# Patient Record
Sex: FEMALE | Race: WHITE | NOT HISPANIC OR LATINO
[De-identification: names, ages, dates, MRNs, and addresses within clinical notes are randomized per-mention and may not be internally consistent; named-entity substitution may affect disease eponyms.]

---

## 2019-10-16 ENCOUNTER — TRANSCRIPTION ENCOUNTER (OUTPATIENT)
Age: 34
End: 2019-10-16

## 2020-05-09 ENCOUNTER — TRANSCRIPTION ENCOUNTER (OUTPATIENT)
Age: 35
End: 2020-05-09

## 2020-08-13 ENCOUNTER — EMERGENCY (EMERGENCY)
Facility: HOSPITAL | Age: 35
LOS: 0 days | Discharge: HOME | End: 2020-08-14
Attending: EMERGENCY MEDICINE | Admitting: EMERGENCY MEDICINE
Payer: MEDICAID

## 2020-08-13 VITALS
OXYGEN SATURATION: 100 % | RESPIRATION RATE: 16 BRPM | HEART RATE: 83 BPM | DIASTOLIC BLOOD PRESSURE: 74 MMHG | WEIGHT: 160.06 LBS | SYSTOLIC BLOOD PRESSURE: 118 MMHG | TEMPERATURE: 99 F

## 2020-08-13 DIAGNOSIS — Z98.891 HISTORY OF UTERINE SCAR FROM PREVIOUS SURGERY: ICD-10-CM
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
## 2020-08-13 DIAGNOSIS — S92.902A UNSPECIFIED FRACTURE OF LEFT FOOT, INITIAL ENCOUNTER FOR CLOSED FRACTURE: ICD-10-CM
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
## 2020-08-13 DIAGNOSIS — Z98.84 BARIATRIC SURGERY STATUS: Chronic | ICD-10-CM

## 2020-08-13 DIAGNOSIS — Z98.890 OTHER SPECIFIED POSTPROCEDURAL STATES: Chronic | ICD-10-CM

## 2020-08-13 DIAGNOSIS — M79.673 PAIN IN UNSPECIFIED FOOT: ICD-10-CM
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
## 2020-08-13 DIAGNOSIS — Z98.891 HISTORY OF UTERINE SCAR FROM PREVIOUS SURGERY: Chronic | ICD-10-CM

## 2020-08-13 PROCEDURE — 99284 EMERGENCY DEPT VISIT MOD MDM: CPT

## 2020-08-13 RX ORDER — KETOROLAC TROMETHAMINE 30 MG/ML
15 SYRINGE (ML) INJECTION ONCE
Refills: 0 | Status: DISCONTINUED | OUTPATIENT
Start: 2020-08-13 | End: 2020-08-13

## 2020-08-13 RX ADMIN — Medication 15 MILLIGRAM(S): at 23:50

## 2020-08-13 NOTE — ED ADULT NURSE NOTE - OBJECTIVE STATEMENT
Patient reports trip and fall, felt something crack to left lateral foot, has pain and swelling, able to bear weight with pain. Patient denies any other injury.

## 2020-08-13 NOTE — ED ADULT NURSE NOTE - CHIEF COMPLAINT QUOTE
pt states she slipped in her bathroom and "heard he bones crack"  pt c.o. right foot pain  (-)head trauma/LOC

## 2020-08-13 NOTE — ED ADULT TRIAGE NOTE - ESI TRIAGE ACUITY LEVEL, MLM
Daily Note     Today's date: 10/16/2018  Patient name: Romana Carmel  : 1989  MRN: [id number]  Referring provider: Jil Hoffman MD  Dx:   Encounter Diagnosis     ICD-10-CM    1  Tenosynovitis of right wrist M65 9                   Subjective: "It's a little stiff, but getting better"    Objective: See treatment diary below  Assessment: Has difficulty making a tight grasp and presents with a little tightness in intrinsic muscles  Scar tissue appears to be affecting tendon gliding  Paper tape administered and educated Jordan Chen on benefits of using paper tape at night over scar  Plan: Continue per plan of care       Precautions: Edwards    Specialty Daily Treatment Diary     Manual  10/2 10/5 10/12 10/16    AAROM digits 5' 5' Place and hold for tendon excursion  5' Place and hold for tendon excursion  5' Place and hold     Wrist stretching-gentle 5' 5' 5' 5'    STM  10' with scar sucker 5' 5'                        Exercise Diary  10/2 10/5 10/12 10/16    HEP: TG, blocking, wrist ROM Issued       Progressive grasp pencils  2x      Keypegs  1x 1x 1x    Wrist maze        Rubber bands on tennis ball   1x      Tennis ball wall walking   10x Green ball 10x Green Ball 10x    GPW  Gentle grasp and thumb flexion x30  Digits and thumb 3x10  Digits and thumb 2x10  BPW Digits and thumb 1x10    Yellow ext web    3x10 3x10    Green ball on wall circles   3x10 3x10    pegs   In with green, out with 4th  In with green, out with 4th                                                                                         Modalities 10/2 10/5 10/12 10/16    MHP 10' 10' 10' 10'    Ultrasound   8' 8' 4

## 2020-08-13 NOTE — ED ADULT TRIAGE NOTE - CHIEF COMPLAINT QUOTE
pt states she slipped in her bathroom and "heard he bones crack"  pt c.o. right foot pain  (-)head trauma/LOC Attending Only

## 2020-08-14 PROCEDURE — 73610 X-RAY EXAM OF ANKLE: CPT | Mod: 26,LT

## 2020-08-14 PROCEDURE — 73630 X-RAY EXAM OF FOOT: CPT | Mod: 26,LT

## 2020-08-14 RX ADMIN — Medication 15 MILLIGRAM(S): at 00:21

## 2020-08-14 NOTE — ED PROVIDER NOTE - NS ED ROS FT
Constitutional: (-) fever  Eyes/ENT: (-) blurry vision, (-) epistaxis  Cardiovascular: (-) chest pain, (-) syncope  Respiratory: (-) cough, (-) shortness of breath  Gastrointestinal: (-) vomiting, (-) diarrhea  Musculoskeletal: (-) neck pain, (-) back pain,  Integumentary: (-) rash, (-) edema  Neurological: (-) headache, (-) altered mental status  Psychiatric: (-) hallucinations  Allergic/Immunologic: (-) pruritus

## 2020-08-14 NOTE — ED PROVIDER NOTE - ATTENDING CONTRIBUTION TO CARE
pt co left foot pain after slipping and falling. no other inj. tender/edema over base of 5th toe/distal mt. nml skin/pulses/cap ref, sensory.   imaging, nsaids, outpt f/u.

## 2020-08-14 NOTE — ED PROVIDER NOTE - NSFOLLOWUPCLINICS_GEN_ALL_ED_FT
[facility]  Orthpedic  [address], [place]   Phone: [phone]  Fax:   Follow Up Time: 1-3 Days

## 2020-08-14 NOTE — ED PROVIDER NOTE - PATIENT PORTAL LINK FT
You can access the FollowMyHealth Patient Portal offered by [facility] by registering at the following website: [email or web address]. By joining [employer]’s FollowMyHealth portal, you will also be able to view your health information using other applications (apps) compatible with our system.

## 2020-08-14 NOTE — ED PROVIDER NOTE - OBJECTIVE STATEMENT
Pt is a 33 yo F w/ no sig pmhx presenting w/ Left food pain and swelling and she slipped and fell in the bathtub tonight. She reports she slipped down the side of the tub. No head trauma, no LOC. Denies any other pain .

## 2020-08-14 NOTE — ED PROVIDER NOTE - ADDITIONAL NOTES AND INSTRUCTIONS:
This Patient was seen in our ED today for an urgent issue. Please excuse them from work /school for today.  They may return on the date above with the following restrictions: activity as tolerated

## 2020-08-14 NOTE — ED PROVIDER NOTE - CARE PROVIDER_API CALL
Beni López  Surgery  [address] [place] [id number]  Phone: [phone]  Fax: [phone]  Follow Up Time: 1-3 Days

## 2020-08-14 NOTE — ED PROVIDER NOTE - NSFOLLOWUPINSTRUCTIONS_ED_ALL_ED_FT
Please follow up in either orthopedic clinic or with a podiatrist of your choice.     Foot Fracture in Adults    WHAT YOU NEED TO KNOW:    A foot fracture is a break in one or more of the bones in your foot. Foot fractures are commonly caused by trauma, falls, or repeated stress injuries. Foot Anatomy         DISCHARGE INSTRUCTIONS:    Medicines:     Antibiotics: This medicine is given to help treat or prevent an infection caused by bacteria.       NSAIDs: These medicines decrease swelling and pain. NSAIDs are available without a doctor's order. Ask which medicine is right for you. Ask how much to take and when to take it. Take as directed. NSAIDs can cause stomach bleeding and kidney problems if not taken correctly.      Pain medicine: You may be given a prescription medicine to decrease pain. Do not wait until the pain is severe before you take this medicine.      Take your medicine as directed. Contact your healthcare provider if you think your medicine is not helping or if you have side effects. Tell him of her if you are allergic to any medicine. Keep a list of the medicines, vitamins, and herbs you take. Include the amounts, and when and why you take them. Bring the list or the pill bottles to follow-up visits. Carry your medicine list with you in case of an emergency.    Follow up with your healthcare provider or bone specialist as directed: You may need to return to have your splint or stitches removed. You may also need to return for tests to make sure your foot is healing. Write down your questions so you remember to ask them during your visits.    Wound care: Carefully wash the wound with soap and water. Dry the area and put on new, clean bandages as directed. Change your bandages when they get wet or dirty.    Self-care:     Rest: You may need to rest your foot and avoid activities that cause pain. For stress fractures, you will need to avoid the activity that caused the fracture until it heals. Ask when you can return to your normal activities such as work and sports.      Ice: Ice helps decrease swelling and pain. Ice may also help prevent tissue damage. Use an ice pack or put crushed ice in a plastic bag. Cover it with a towel, and place it on your foot for 15 to 20 minutes every hour as directed.      Elevate your foot: Raise your foot at or above the level of your heart as often as you can. This will help decrease swelling and pain. Prop your foot on pillows or blankets to keep it elevated comfortably.       Physical therapy: Once your foot has healed, a physical therapist can teach you exercises to help improve movement and strength, and to decrease pain.    Splint care:     Check the skin around your splint daily for any redness or open areas.      Do not use a sharp or pointed object to scratch your skin under the splint.      Do not remove your splint unless your healthcare provider or orthopedic surgeon says it is okay.    Bathing with a splint: Do not let your splint get wet. Before bathing, cover the splint with a plastic bag. Tape the bag to your skin above the splint to seal out the water. Keep your foot out of the water in case the bag leaks. Ask when it is okay to take a bath or shower.    Assistive devices: You may be given a hard-soled shoe to wear while your foot is healing. You also may need to use crutches to help you walk while your foot heals. It is important to use your crutches correctly. Ask for more information about how to use crutches.    Contact your healthcare provider or bone specialist if:     You have a fever.      You have new sores around your boot or splint.      You have new or worsening trouble moving your foot.      You notice a foul smell coming from under your splint.      Your boot or splint gets damaged.      You have questions or concerns about your condition or care.    Return to the emergency department if:     The pain in your injured foot gets worse even after you rest and take pain medicine.      The skin or toes of your foot become numb, swollen, cold, white, or blue.      You have more pain or swelling than you did before the splint was put on.      Your wound is draining fluid or pus.      Blood soaks through your bandage.      Your leg feels warm, tender, and painful. It may look swollen and red.      You suddenly feel lightheaded and short of breath.      You have chest pain when you take a deep breath or cough. You may cough up blood.         © Copyright [employer] 2019 All illustrations and images included in CareNotes are the copyrighted property of [employer] or [employer] ([employer]).

## 2021-01-04 ENCOUNTER — TRANSCRIPTION ENCOUNTER (OUTPATIENT)
Age: 36
End: 2021-01-04

## 2021-11-09 ENCOUNTER — TRANSCRIPTION ENCOUNTER (OUTPATIENT)
Age: 36
End: 2021-11-09

## 2022-03-28 ENCOUNTER — EMERGENCY (EMERGENCY)
Facility: HOSPITAL | Age: 37
LOS: 0 days | Discharge: HOME | End: 2022-03-28
Attending: EMERGENCY MEDICINE | Admitting: EMERGENCY MEDICINE
Payer: COMMERCIAL

## 2022-03-28 VITALS
RESPIRATION RATE: 16 BRPM | WEIGHT: 160.06 LBS | HEART RATE: 94 BPM | SYSTOLIC BLOOD PRESSURE: 146 MMHG | TEMPERATURE: 98 F | DIASTOLIC BLOOD PRESSURE: 85 MMHG | OXYGEN SATURATION: 99 %

## 2022-03-28 DIAGNOSIS — Z98.84 BARIATRIC SURGERY STATUS: Chronic | ICD-10-CM
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
## 2022-03-28 DIAGNOSIS — M54.50 LOW BACK PAIN, UNSPECIFIED: ICD-10-CM

## 2022-03-28 DIAGNOSIS — Z98.891 HISTORY OF UTERINE SCAR FROM PREVIOUS SURGERY: Chronic | ICD-10-CM

## 2022-03-28 DIAGNOSIS — Z98.890 OTHER SPECIFIED POSTPROCEDURAL STATES: Chronic | ICD-10-CM
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
## 2022-03-28 PROBLEM — O03.9 COMPLETE OR UNSPECIFIED SPONTANEOUS ABORTION WITHOUT COMPLICATION: Chronic | Status: ACTIVE | Noted: 2020-08-13

## 2022-03-28 PROCEDURE — 99284 EMERGENCY DEPT VISIT MOD MDM: CPT

## 2022-03-28 RX ORDER — LIDOCAINE 4 G/100G
1 CREAM TOPICAL ONCE
Refills: 0 | Status: COMPLETED | OUTPATIENT
Start: 2022-03-28 | End: 2022-03-28

## 2022-03-28 RX ORDER — ACETAMINOPHEN 500 MG
650 TABLET ORAL ONCE
Refills: 0 | Status: COMPLETED | OUTPATIENT
Start: 2022-03-28 | End: 2022-03-28

## 2022-03-28 RX ORDER — METHOCARBAMOL 500 MG/1
1000 TABLET, FILM COATED ORAL ONCE
Refills: 0 | Status: COMPLETED | OUTPATIENT
Start: 2022-03-28 | End: 2022-03-28

## 2022-03-28 RX ADMIN — Medication 650 MILLIGRAM(S): at 17:43

## 2022-03-28 RX ADMIN — METHOCARBAMOL 500 MILLIGRAM(S): 500 TABLET, FILM COATED ORAL at 17:43

## 2022-03-28 RX ADMIN — LIDOCAINE 1 PATCH: 4 CREAM TOPICAL at 17:42

## 2022-03-28 NOTE — ED PROVIDER NOTE - PHYSICAL EXAMINATION
CONSTITUTIONAL: Well-developed; well-nourished; in no acute distress, nontoxic appearing  SKIN: skin exam is warm and dry  HEAD: Normocephalic; atraumatic  NECK: No c-spine tenderness  CARD: S1, S2 normal, no murmur  RESP: No wheezes, rales or rhonchi. Good air movement bilaterally  ABD: soft; non-distended; non-tender.    EXT: +R sided paraspinal lumbar ttp, no skin changes, no midline tenderness. pelvis stable. steady gait.   NEURO: awake, alert, following commands, oriented, grossly unremarkable. No Focal deficits. GCS 15.   PSYCH: Cooperative, appropriate.
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
## 2022-03-28 NOTE — ED PROVIDER NOTE - NSFOLLOWUPCLINICS_GEN_ALL_ED_FT
[facility] ([facility])  Rehabilitation  [facility] [address], [address] [place] [address]  Phone: [phone]  Fax:

## 2022-03-28 NOTE — ED PROVIDER NOTE - PATIENT PORTAL LINK FT
You can access the FollowMyHealth Patient Portal offered by [facility] by registering at the following website: [email or web address]. By joining [employer]’s FollowMyHealth portal, you will also be able to view your health information using other applications (apps) compatible with our system.

## 2022-03-28 NOTE — ED PROVIDER NOTE - NS ED ROS FT
Review of Systems:  	•	CONSTITUTIONAL: no fever   	•	SKIN: no rash   	•	EYES: no eye pain, no blurry vision   	•	RESPIRATORY: no shortness of breath, no cough  	•	CARDIAC: no chest pain, no palpitations  	•	GI: no abd pain, no nausea, no vomiting, no diarrhea   	•	GENITO-URINARY: no discharge, no dysuria; no hematuria, no increased urinary frequency  	•	MUSCULOSKELETAL: +R lower back pain. no joint paint, no swelling, no redness  	•	NEUROLOGIC: no weakness, no headache, no paresthesias   	•	PSYCH: no anxiety, non suicidal, non homicidal, no hallucination, no depression

## 2022-03-28 NOTE — ED ADULT NURSE NOTE - NSICDXPASTSURGICALHX_GEN_ALL_CORE_FT
PAST SURGICAL HISTORY:  H/O:      History of bariatric surgery Gastric sleeve    History of D&C

## 2022-03-28 NOTE — ED PROVIDER NOTE - NS ED ATTENDING STATEMENT MOD
This was a shared visit with the GIANA. I reviewed and verified the documentation and independently performed the documented:
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
## 2022-03-28 NOTE — ED ADULT NURSE NOTE - SUICIDE SCREENING QUESTION 3
Problem: Patient Care Overview  Goal: Plan of Care/Patient Progress Review  Outcome: No Change  Alert & oriented x 4, calm and cooperative,independent, denies pain, headache and nausea, CIWA 0, VSS on RA, lung sound diminished, tele NSR, discharge planning in progress.       No
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
## 2022-05-28 ENCOUNTER — NON-APPOINTMENT (OUTPATIENT)
Age: 37
End: 2022-05-28

## 2023-01-04 ENCOUNTER — NON-APPOINTMENT (OUTPATIENT)
Age: 38
End: 2023-01-04

## 2023-03-20 ENCOUNTER — NON-APPOINTMENT (OUTPATIENT)
Age: 38
End: 2023-03-20

## 2023-07-19 PROBLEM — Z00.00 ENCOUNTER FOR PREVENTIVE HEALTH EXAMINATION: Status: ACTIVE | Noted: 2023-07-19

## 2023-07-27 ENCOUNTER — APPOINTMENT (OUTPATIENT)
Dept: SURGERY | Facility: CLINIC | Age: 38
End: 2023-07-27
Payer: MEDICAID

## 2023-07-27 VITALS
WEIGHT: 160 LBS | HEART RATE: 92 BPM | DIASTOLIC BLOOD PRESSURE: 82 MMHG | OXYGEN SATURATION: 99 % | HEIGHT: 62 IN | TEMPERATURE: 97.2 F | SYSTOLIC BLOOD PRESSURE: 100 MMHG | BODY MASS INDEX: 29.44 KG/M2

## 2023-07-27 DIAGNOSIS — D64.9 ANEMIA, UNSPECIFIED: ICD-10-CM

## 2023-07-27 PROCEDURE — 99204 OFFICE O/P NEW MOD 45 MIN: CPT

## 2023-07-31 ENCOUNTER — NON-APPOINTMENT (OUTPATIENT)
Age: 38
End: 2023-07-31

## 2023-08-03 ENCOUNTER — OUTPATIENT (OUTPATIENT)
Dept: OUTPATIENT SERVICES | Facility: HOSPITAL | Age: 38
LOS: 1 days | End: 2023-08-03
Payer: MEDICAID

## 2023-08-03 ENCOUNTER — RESULT REVIEW (OUTPATIENT)
Age: 38
End: 2023-08-03

## 2023-08-03 VITALS
OXYGEN SATURATION: 100 % | SYSTOLIC BLOOD PRESSURE: 132 MMHG | DIASTOLIC BLOOD PRESSURE: 77 MMHG | RESPIRATION RATE: 16 BRPM | WEIGHT: 160.06 LBS | HEIGHT: 61 IN | HEART RATE: 88 BPM | TEMPERATURE: 99 F

## 2023-08-03 DIAGNOSIS — Z98.891 HISTORY OF UTERINE SCAR FROM PREVIOUS SURGERY: Chronic | ICD-10-CM

## 2023-08-03 DIAGNOSIS — Z98.890 OTHER SPECIFIED POSTPROCEDURAL STATES: Chronic | ICD-10-CM

## 2023-08-03 DIAGNOSIS — Z01.818 ENCOUNTER FOR OTHER PREPROCEDURAL EXAMINATION: ICD-10-CM

## 2023-08-03 DIAGNOSIS — Z98.84 BARIATRIC SURGERY STATUS: Chronic | ICD-10-CM

## 2023-08-03 DIAGNOSIS — C49.22 MALIGNANT NEOPLASM OF CONNECTIVE AND SOFT TISSUE OF LEFT LOWER LIMB, INCLUDING HIP: ICD-10-CM

## 2023-08-03 LAB
ALBUMIN SERPL ELPH-MCNC: 4.8 G/DL — SIGNIFICANT CHANGE UP (ref 3.5–5.2)
ALP SERPL-CCNC: 69 U/L — SIGNIFICANT CHANGE UP (ref 30–115)
ALT FLD-CCNC: 6 U/L — SIGNIFICANT CHANGE UP (ref 0–41)
ANION GAP SERPL CALC-SCNC: 13 MMOL/L — SIGNIFICANT CHANGE UP (ref 7–14)
APTT BLD: 29.9 SEC — SIGNIFICANT CHANGE UP (ref 27–39.2)
AST SERPL-CCNC: 16 U/L — SIGNIFICANT CHANGE UP (ref 0–41)
BASOPHILS # BLD AUTO: 0.05 K/UL — SIGNIFICANT CHANGE UP (ref 0–0.2)
BASOPHILS NFR BLD AUTO: 0.6 % — SIGNIFICANT CHANGE UP (ref 0–1)
BILIRUB SERPL-MCNC: 0.3 MG/DL — SIGNIFICANT CHANGE UP (ref 0.2–1.2)
BUN SERPL-MCNC: 6 MG/DL — LOW (ref 10–20)
CALCIUM SERPL-MCNC: 9.3 MG/DL — SIGNIFICANT CHANGE UP (ref 8.4–10.5)
CHLORIDE SERPL-SCNC: 101 MMOL/L — SIGNIFICANT CHANGE UP (ref 98–110)
CO2 SERPL-SCNC: 23 MMOL/L — SIGNIFICANT CHANGE UP (ref 17–32)
CREAT SERPL-MCNC: 0.7 MG/DL — SIGNIFICANT CHANGE UP (ref 0.7–1.5)
EGFR: 116 ML/MIN/1.73M2 — SIGNIFICANT CHANGE UP
EOSINOPHIL # BLD AUTO: 0.06 K/UL — SIGNIFICANT CHANGE UP (ref 0–0.7)
EOSINOPHIL NFR BLD AUTO: 0.7 % — SIGNIFICANT CHANGE UP (ref 0–8)
GLUCOSE SERPL-MCNC: 71 MG/DL — SIGNIFICANT CHANGE UP (ref 70–99)
HCT VFR BLD CALC: 39.6 % — SIGNIFICANT CHANGE UP (ref 37–47)
HGB BLD-MCNC: 12.8 G/DL — SIGNIFICANT CHANGE UP (ref 12–16)
IMM GRANULOCYTES NFR BLD AUTO: 0.2 % — SIGNIFICANT CHANGE UP (ref 0.1–0.3)
INR BLD: 0.91 RATIO — SIGNIFICANT CHANGE UP (ref 0.65–1.3)
LYMPHOCYTES # BLD AUTO: 3.23 K/UL — SIGNIFICANT CHANGE UP (ref 1.2–3.4)
LYMPHOCYTES # BLD AUTO: 36.7 % — SIGNIFICANT CHANGE UP (ref 20.5–51.1)
MCHC RBC-ENTMCNC: 25.9 PG — LOW (ref 27–31)
MCHC RBC-ENTMCNC: 32.3 G/DL — SIGNIFICANT CHANGE UP (ref 32–37)
MCV RBC AUTO: 80 FL — LOW (ref 81–99)
MONOCYTES # BLD AUTO: 0.6 K/UL — SIGNIFICANT CHANGE UP (ref 0.1–0.6)
MONOCYTES NFR BLD AUTO: 6.8 % — SIGNIFICANT CHANGE UP (ref 1.7–9.3)
NEUTROPHILS # BLD AUTO: 4.85 K/UL — SIGNIFICANT CHANGE UP (ref 1.4–6.5)
NEUTROPHILS NFR BLD AUTO: 55 % — SIGNIFICANT CHANGE UP (ref 42.2–75.2)
NRBC # BLD: 0 /100 WBCS — SIGNIFICANT CHANGE UP (ref 0–0)
PLATELET # BLD AUTO: 255 K/UL — SIGNIFICANT CHANGE UP (ref 130–400)
PMV BLD: 10.8 FL — HIGH (ref 7.4–10.4)
POTASSIUM SERPL-MCNC: 3.9 MMOL/L — SIGNIFICANT CHANGE UP (ref 3.5–5)
POTASSIUM SERPL-SCNC: 3.9 MMOL/L — SIGNIFICANT CHANGE UP (ref 3.5–5)
PROT SERPL-MCNC: 7.6 G/DL — SIGNIFICANT CHANGE UP (ref 6–8)
PROTHROM AB SERPL-ACNC: 10.4 SEC — SIGNIFICANT CHANGE UP (ref 9.95–12.87)
RBC # BLD: 4.95 M/UL — SIGNIFICANT CHANGE UP (ref 4.2–5.4)
RBC # FLD: 16.4 % — HIGH (ref 11.5–14.5)
SODIUM SERPL-SCNC: 137 MMOL/L — SIGNIFICANT CHANGE UP (ref 135–146)
WBC # BLD: 8.81 K/UL — SIGNIFICANT CHANGE UP (ref 4.8–10.8)
WBC # FLD AUTO: 8.81 K/UL — SIGNIFICANT CHANGE UP (ref 4.8–10.8)

## 2023-08-03 PROCEDURE — 93010 ELECTROCARDIOGRAM REPORT: CPT

## 2023-08-03 PROCEDURE — 85025 COMPLETE CBC W/AUTO DIFF WBC: CPT

## 2023-08-03 PROCEDURE — 71046 X-RAY EXAM CHEST 2 VIEWS: CPT

## 2023-08-03 PROCEDURE — 99214 OFFICE O/P EST MOD 30 MIN: CPT | Mod: 25

## 2023-08-03 PROCEDURE — 85730 THROMBOPLASTIN TIME PARTIAL: CPT

## 2023-08-03 PROCEDURE — 71046 X-RAY EXAM CHEST 2 VIEWS: CPT | Mod: 26

## 2023-08-03 PROCEDURE — 93005 ELECTROCARDIOGRAM TRACING: CPT

## 2023-08-03 PROCEDURE — 36415 COLL VENOUS BLD VENIPUNCTURE: CPT

## 2023-08-03 PROCEDURE — 85610 PROTHROMBIN TIME: CPT

## 2023-08-03 PROCEDURE — 80053 COMPREHEN METABOLIC PANEL: CPT

## 2023-08-03 NOTE — H&P PST ADULT - NSICDXPASTMEDICALHX_GEN_ALL_CORE_FT
PAST MEDICAL HISTORY:  Anemia     H/O bariatric surgery     H/O herpes simplex infection     Miscarriage     Ovarian cyst     Sarcoma

## 2023-08-03 NOTE — H&P PST ADULT - NSICDXPASTSURGICALHX_GEN_ALL_CORE_FT
PAST SURGICAL HISTORY:  H/O abdominoplasty     H/O:      History of bariatric surgery Gastric sleeve    History of D&C

## 2023-08-03 NOTE — H&P PST ADULT - HISTORY OF PRESENT ILLNESS
Patient is a   37 year old  female presenting to PAST in preparation for EXCISION OF LEFT THIGH SOFT TISSUE NEOPLASM  on 8/9/23  under general anesthesia by Dr. Tejada  .  pt had removal of "cyst" in derm office 6/2023. pathology + "rare sarcoma" pt is scheduled for surgical excision    PATIENT CURRENTLY DENIES CHEST PAIN  SHORTNESS OF BREATH  PALPITATIONS,  DYSURIA, OR UPPER RESPIRATORY INFECTION IN PAST 2 WEEKS  EXERCISE  TOLERANCE  3+ FLIGHT OF STAIRS  WITHOUT SHORTNESS OF BREATH  denies travel outside the [place] in the past 30 days  Patient denies any signs or symptoms of COVID 19    Anesthesia Alert  NO--Difficult Airway  NO--History of neck surgery or radiation  NO--Limited ROM of neck  NO--History of Malignant hyperthermia  NO--No personal or family history of Pseudocholinesterase deficiency.  NO--Prior Anesthesia Complication  NO--Latex Allergy  NO--Loose teeth  NO--History of Rheumatoid Arthritis  NO--Bleeding risk  NO--LAY  NO--Other_____    PT DENIES ANY RASHES, ABRASION, OR OPEN WOUNDS OR CUTS    AS PER THE PT, THIS IS HIS/HER COMPLETE MEDICAL AND SURGICAL HX, INCLUDING MEDICATIONS PRESCRIBED AND OVER THE COUNTER    Patient verbalized understanding of instructions and was given the opportunity to ask questions and have them answered.    pt denies any suicidal ideation or thoughts, pt states not a threat to self or others

## 2023-08-04 DIAGNOSIS — Z01.818 ENCOUNTER FOR OTHER PREPROCEDURAL EXAMINATION: ICD-10-CM

## 2023-08-04 DIAGNOSIS — C49.22 MALIGNANT NEOPLASM OF CONNECTIVE AND SOFT TISSUE OF LEFT LOWER LIMB, INCLUDING HIP: ICD-10-CM

## 2023-08-07 NOTE — ASSESSMENT
[FreeTextEntry1] : 37F who presented from Dr. 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   PLAN:  - CT chest to evaluate for metastases - OR for wide local excision to assure negative margins  the above plan of care with discussed in details to the patient and all questions were answered to patient satisfaction. patient instructed to follow up with the referring physician and patient primary care provider  A total of 60 minutes was spent on this visit, obtaining h/p,  reviewing previous notes/path, counseling the patient on coming procedure , ordering tests (below), and documenting the findings in the note.

## 2023-08-07 NOTE — PHYSICAL EXAM
[JVD] : no jugular venous distention  [Normal Thyroid] : the thyroid was normal [Carotid Bruits] : no carotid bruits [Normal Breath Sounds] : Normal breath sounds [Normal Heart Sounds] : normal heart sounds [Abdominal Masses] : No abdominal masses [Abdomen Tenderness] : ~T ~M No abdominal tenderness [Tender] : nontender [Enlarged] : not enlarged [No Rash or Lesion] : No rash or lesion [Purpura] : no purpura  [Petechiae] : no petechiae [Skin Ulcer] : no ulcer [Skin Induration] : no induration [Alert] : alert [Oriented to Person] : oriented to person [Oriented to Place] : oriented to place [Oriented to Time] : oriented to time [de-identified] : 2cm ill-defined lesion at left outer thigh

## 2023-08-07 NOTE — HISTORY OF PRESENT ILLNESS
[de-identified] : 37F who presented from Dr. Pate's office with pathology results of intermediate grade sarcoma.  She reports a L outer thigh lesion she first noticed in 2019. She thought it was a pimple at first. It has slightly enlarged in size. No associated pain.  She got it resected at her dermatologist's office. Pathology was notable for intermediate grade sarcoma.  No numbness, weakness, tingling with her LLE.  No fever, chills, weight loss.   PATHOLOGY L posterior thigh excision: pleomorphic soft tissue neoplasm Was difficult to classify, so sent to [facility] for 2nd opinion.  Addendum: superficial CD34-positive   PMH: anemia, COVID, ovarian cysts PSH: abdominoplasty 2013, laparoscopic sleeve gastrectomy ([facility] 2012) Meds: iron, MVI Smoking/Alcohol: none 3 children FAMILY HISTORY  Maternal aunt: uterine cancer, stomach cancer

## 2023-08-08 NOTE — ASU PATIENT PROFILE, ADULT - FALL HARM RISK - UNIVERSAL INTERVENTIONS
How Did The Scalp Condition Occur?: gradual in onset  (over a period of years)
How Severe Is The Scalp Condition?: severe
Additional History: Pt is using ciclopirox shampoo and a OTC anti dandruff serum
Bed in lowest position, wheels locked, appropriate side rails in place/Call bell, personal items and telephone in reach/Instruct patient to call for assistance before getting out of bed or chair/Non-slip footwear when patient is out of bed/Bernard to call system/Purposeful Proactive Rounding/Room/bathroom lighting operational, light cord in reach

## 2023-08-09 ENCOUNTER — RESULT REVIEW (OUTPATIENT)
Age: 38
End: 2023-08-09

## 2023-08-09 ENCOUNTER — TRANSCRIPTION ENCOUNTER (OUTPATIENT)
Age: 38
End: 2023-08-09

## 2023-08-09 ENCOUNTER — OUTPATIENT (OUTPATIENT)
Dept: INPATIENT UNIT | Facility: HOSPITAL | Age: 38
LOS: 1 days | Discharge: HOME | End: 2023-08-09
Payer: MEDICAID

## 2023-08-09 ENCOUNTER — APPOINTMENT (OUTPATIENT)
Dept: SURGERY | Facility: HOSPITAL | Age: 38
End: 2023-08-09

## 2023-08-09 VITALS
TEMPERATURE: 98 F | DIASTOLIC BLOOD PRESSURE: 69 MMHG | OXYGEN SATURATION: 100 % | HEIGHT: 62 IN | SYSTOLIC BLOOD PRESSURE: 105 MMHG | RESPIRATION RATE: 17 BRPM | WEIGHT: 160.06 LBS | HEART RATE: 69 BPM

## 2023-08-09 VITALS
RESPIRATION RATE: 15 BRPM | OXYGEN SATURATION: 100 % | SYSTOLIC BLOOD PRESSURE: 106 MMHG | HEART RATE: 79 BPM | TEMPERATURE: 98 F | DIASTOLIC BLOOD PRESSURE: 65 MMHG

## 2023-08-09 DIAGNOSIS — Z98.84 BARIATRIC SURGERY STATUS: Chronic | ICD-10-CM

## 2023-08-09 DIAGNOSIS — C49.22 MALIGNANT NEOPLASM OF CONNECTIVE AND SOFT TISSUE OF LEFT LOWER LIMB, INCLUDING HIP: ICD-10-CM

## 2023-08-09 DIAGNOSIS — Z98.890 OTHER SPECIFIED POSTPROCEDURAL STATES: Chronic | ICD-10-CM

## 2023-08-09 DIAGNOSIS — C44.709 UNSPECIFIED MALIGNANT NEOPLASM OF SKIN OF LEFT LOWER LIMB, INCLUDING HIP: ICD-10-CM

## 2023-08-09 DIAGNOSIS — Z98.891 HISTORY OF UTERINE SCAR FROM PREVIOUS SURGERY: Chronic | ICD-10-CM

## 2023-08-09 DIAGNOSIS — D64.9 ANEMIA, UNSPECIFIED: ICD-10-CM

## 2023-08-09 PROCEDURE — 27364 RESECT THIGH/KNEE TUM 5 CM/>: CPT

## 2023-08-09 PROCEDURE — 15738 MUSCLE-SKIN GRAFT LEG: CPT

## 2023-08-09 PROCEDURE — 88304 TISSUE EXAM BY PATHOLOGIST: CPT | Mod: 26

## 2023-08-09 PROCEDURE — 88304 TISSUE EXAM BY PATHOLOGIST: CPT

## 2023-08-09 RX ORDER — ONDANSETRON 8 MG/1
4 TABLET, FILM COATED ORAL ONCE
Refills: 0 | Status: DISCONTINUED | OUTPATIENT
Start: 2023-08-09 | End: 2023-08-09

## 2023-08-09 RX ORDER — HYDROMORPHONE HYDROCHLORIDE 2 MG/ML
0.5 INJECTION INTRAMUSCULAR; INTRAVENOUS; SUBCUTANEOUS
Refills: 0 | Status: DISCONTINUED | OUTPATIENT
Start: 2023-08-09 | End: 2023-08-09

## 2023-08-09 RX ORDER — HYDROMORPHONE HYDROCHLORIDE 2 MG/ML
1 INJECTION INTRAMUSCULAR; INTRAVENOUS; SUBCUTANEOUS
Refills: 0 | Status: DISCONTINUED | OUTPATIENT
Start: 2023-08-09 | End: 2023-08-09

## 2023-08-09 RX ORDER — SODIUM CHLORIDE 9 MG/ML
1000 INJECTION, SOLUTION INTRAVENOUS
Refills: 0 | Status: DISCONTINUED | OUTPATIENT
Start: 2023-08-09 | End: 2023-08-09

## 2023-08-09 RX ADMIN — SODIUM CHLORIDE 100 MILLILITER(S): 9 INJECTION, SOLUTION INTRAVENOUS at 13:00

## 2023-08-09 RX ADMIN — HYDROMORPHONE HYDROCHLORIDE 0.5 MILLIGRAM(S): 2 INJECTION INTRAMUSCULAR; INTRAVENOUS; SUBCUTANEOUS at 14:43

## 2023-08-09 RX ADMIN — HYDROMORPHONE HYDROCHLORIDE 0.5 MILLIGRAM(S): 2 INJECTION INTRAMUSCULAR; INTRAVENOUS; SUBCUTANEOUS at 14:03

## 2023-08-09 NOTE — CHART NOTE - NSCHARTNOTEFT_GEN_A_CORE
PACU ANESTHESIA ADMISSION NOTE      Procedure: LLE mass removal    ____  Intubated  TV:______       Rate: ______      FiO2: ______    ___x_  Patent Airway    __x__  Full return of protective reflexes    __x__  Full recovery from anesthesia / back to baseline     Vitals:   T:     98.5      R:    25              BP:   117/66               Sat:        100           P: 105      Mental Status:  ___x_ Awake   _____ Alert   _____ Drowsy   _____ Sedated    Nausea/Vomiting:  __x__ NO  ______Yes,   See Post - Op Orders          Pain Scale (0-10):  _0____    Treatment: ____ None    ____ See Post - Op/PCA Orders    Post - Operative Fluids:   ____ Oral   ___x_ See Post - Op Orders    Plan: Discharge:   __x__Home       _____Floor     _____Critical Care    _____  Other:_________________    Comments:

## 2023-08-09 NOTE — PRE-ANESTHESIA EVALUATION ADULT - HEIGHT IN FEET
Patient: Reuben Ness Date: 3/6/2023   : 1960 Attending: Josh Orellana MD   62 year old male Room: AFI537/01     Chief Complaint: chest pain, shortness of breath  Preoperative  Surgical Procedure: scheduled for CABG, possible modified MAZE (Encompass clamp), and PRADEEP ligation on 3/8/23 with Dr. Davy Allen    Subjective: Denies chest pain or SOB, all questions pertaining to surgery answered.     Vital 24 Hour Range Last Value   Temperature Temp  Min: 97.6 °F (36.4 °C)  Max: 98.6 °F (37 °C) 97.7 °F (36.5 °C) (23 1100)   Pulse Pulse  Min: 64  Max: 72 66 (23 1100)   Respiratory Resp  Min: 16  Max: 19 19 (23 1100)   Non-Invasive  Blood Pressure BP  Min: 146/81  Max: 168/80 (!) 146/81 (23 1100)   Pulse Oximetry SpO2  Min: 98 %  Max: 99 % 99 % (23 1100)     Oxygen: RA    Weight over the past 48 Hours:  Patient Vitals for the past 48 hrs:   Weight   23 1730 68.8 kg (151 lb 9.6 oz)   23 0600 77.4 kg (170 lb 10.2 oz)   23 0435 78.5 kg (173 lb 1 oz)      Admit Weight:   Weight: 80.8 kg (178 lb 2.1 oz) (23)  BMI:   BMI (Calculated): 26.31 (23)    Intake/Output:    Last Stool Occurrence: 1 (23 0233)    No intake/output data recorded.    I/O last 3 completed shifts:  In: 240 [P.O.:240]  Out: 630 [Urine:630]      Intake/Output Summary (Last 24 hours) at 3/6/2023 1516  Last data filed at 3/6/2023 0800  Gross per 24 hour   Intake 240 ml   Output 630 ml   Net -390 ml       Rhythm: Sinus rhythm    Laboratory Results:    Recent Labs   Lab 23  1200 23  0601 23  0641 23  0442 23  1036 23  0419 23  1614 23  0621   SODIUM  --  134* 135 132*  --  133*   < > 132*   POTASSIUM  --  4.8 5.0 4.3  --  4.3   < > 6.0*   CHLORIDE  --  99 101 97  --  98   < > 99   CO2  --  24 25 25  --  29   < > 24   BUN  --  43* 34* 43*  --  30*   < > 56*   CREATININE  --  6.66* 5.15* 6.64*  --  4.78*   < > 7.01*   GLUCOSE  --   228* 215* 189*  --  160*   < > 340*   MG  --   --   --   --   --   --   --  1.8   WBC  --  5.6 6.0 6.9  --  8.2   < > 15.0*   HGB  --  9.3* 9.6* 9.4*  --  9.5*   < > 9.4*   HCT  --  29.2* 30.7* 29.8*  --  30.0*   < > 29.8*   PLT  --  171 204 204  --  235   < > 220   PT  --   --   --   --   --  11.0  --   --    INR  --   --   --   --   --  1.1  --   --    PTT 60* 69* 54*  --    < > 49*   < >  --     < > = values in this interval not displayed.       Cultures: Reviewed    Chest X-Ray: Reviewed    Medications/Infusions:  Scheduled:   Current Facility-Administered Medications   Medication Dose Route Frequency Provider Last Rate Last Admin   • insulin glargine (LANTUS) injection 20 Units  20 Units Subcutaneous Nightly Natasha Avila MD   20 Units at 03/05/23 2027   • insulin lispro (ADMELOG, HumaLOG) injection 7 Units  7 Units Subcutaneous TID  Natasha Avila MD   7 Units at 03/06/23 1233   • ferrous sulfate (65 mg Fe per 325 mg) tablet 325 mg  325 mg Oral Daily with breakfast Lucille Hicks PA-C   325 mg at 03/06/23 0636   • doxazosin (CARDURA) tablet 4 mg  4 mg Oral Nightly Ramon Smith MD   4 mg at 03/05/23 2026   • sodium chloride (PF) 0.9 % injection 2 mL  2 mL Intracatheter 2 times per day Can Olson MD   2 mL at 03/03/23 0924   • traMADol (ULTRAM) tablet 150 mg  150 mg Oral Daily Can Olson MD   150 mg at 03/06/23 0636   • insulin lispro (ADMELOG,HumaLOG) - Correction Dose   Subcutaneous TID  Can Olson MD   8 Units at 03/06/23 1233   • amLODIPine (NORVASC) tablet 10 mg  10 mg Oral Daily Can Olson MD   10 mg at 03/06/23 0637   • losartan (COZAAR) tablet 100 mg  100 mg Oral Daily Can Olson MD   100 mg at 03/06/23 0637   • metoPROLOL tartrate (LOPRESSOR) tablet 50 mg  50 mg Oral BID aCn Olson MD   50 mg at 03/06/23 0637   • torsemide (DEMADEX) tablet 60 mg  60 mg Oral Once per day on Sun Mon Wed Fri Can Olson MD   60 mg at 03/06/23 0637   • pantoprazole (PROTONIX) EC  tablet 40 mg  40 mg Oral BID AC Can Olson MD   40 mg at 03/06/23 0637   • ezetimibe (ZETIA) tablet 10 mg  10 mg Oral Daily Can Olson MD   10 mg at 03/06/23 0636       Continuous Infusions:   Current Facility-Administered Medications   Medication Dose Route Frequency Provider Last Rate Last Admin   • heparin (porcine) 25,000 units/250 mL in dextrose 5 % infusion  1-30 Units/kg/hr (Dosing Weight) Intravenous Continuous Can Olson MD 14.6 mL/hr at 03/06/23 1243 17 Units/kg/hr at 03/06/23 1243       CMS Criteria:  ASA: No, defer to Cardiology    BB: Yes    Statin:Statin not ordered due to Patient intolerance    ACE/ARB: Yes    Surgical Care Improvement Project:  [place] in Place: No    DVT/VTE Prophylaxis:  VTE Pharmacologic Prophylaxis: Yes  VTE Mechanical Prophylaxis: Yes    Beta Blocker: Yes     Surgical Central Line: No    Cardiologist: Dr. Rowland  EF: 56%  Yany MRSA:- MSSA: -  Hemoglobin A1c: 9.1  Preoperative Creatinine: N/A    Assessment/Plan:  NSTEMI/Severe multivessel CAD:  - scheduled for urgent CABG, possible modified MAZE procedure (Encompass clamp), PRADEEP ligation on 3/8/23 with Dr. Allen   - denies angina or angina equivalent symptoms, all questions pertaining to surgery answered  - preop testing reviewed, preop orders placed  - continue BB and heparin gtt to OR    HFpEF/Ischemic cardiomyopathy:  - preop EF 56%  - appears euvolemic, continue torsemide    Paroxysmal atrial flutter:  - newly diagnosed 12/27/22  - hold PTA Eliquis, continue heparin gtt  - plan for possible modified MAZE procedure, PRADEEP ligation    HTN:  - Cardura increased by Nephrology  - continue amlodipine, losartan, and metoprolol  - defer management to Hospitalist and Nephrology    ESRD/Anemia of chronic disease:  - HD T/Th/S  - H/H stable, started on oral iron for iron deficiency  - management per Nephrology    Type 2 DM:  - poorly controlled on basal insulin only PTA  - continue basal and bolus insulin  -  management per Hospitalist    Pathways:  Ambulating: Yes    Discussed with or notes reviewed:  Patient, Family and MD    Discharge Disposition: Home     HENRIK Vásquez  [facility]  Cardiovascular & Thoracic Surgery        5

## 2023-08-09 NOTE — ASU DISCHARGE PLAN (ADULT/PEDIATRIC) - ASU DC SPECIAL INSTRUCTIONSFT
PAIN: You may take Tylenol 650mg and/or Motrin 400mg every 6-8 hours as needed for pain. You may take both at the same time. You may also use ice packs to decrease swelling.  DRESSING: You have a pressure dressing over your incision. Leave it in place for 1 day. Do not shower today. You may remove the foam tape and shower tomorrow. Underneath the pressure dressing, you have another waterproof dressing over the incision. You may shower with that dressing on. Leave it in place for 1 week. You may remove it after that and shower normally.  ACTIVITY: No heavy lifting over 10 pounds for 6 weeks.  FOLLOW UP: With Dr. Tejada as scheduled.

## 2023-08-09 NOTE — BRIEF OPERATIVE NOTE - NSICDXBRIEFPROCEDURE_GEN_ALL_CORE_FT
PROCEDURES:  Excision, sarcoma, thigh, with destruction of nerve 09-Aug-2023 12:49:11 no destruction of nerve Bhavna Reza

## 2023-08-09 NOTE — ASU DISCHARGE PLAN (ADULT/PEDIATRIC) - CARE PROVIDER_API CALL
Sandy Tejada  [facility] General Surgical Oncology  [address] [place] [address] [address] [place] [address]  Phone: [phone]  Fax: [phone]  Follow Up Time:

## 2023-08-09 NOTE — ASU DISCHARGE PLAN (ADULT/PEDIATRIC) - NS MD DC FALL RISK RISK
For information on Fall & Injury Prevention, visit: [email or web address] OR  [email or web address] OR  https://www.cdc.gov/steadi/patient.html

## 2023-08-10 ENCOUNTER — LABORATORY RESULT (OUTPATIENT)
Age: 38
End: 2023-08-10

## 2023-08-22 ENCOUNTER — APPOINTMENT (OUTPATIENT)
Dept: SURGERY | Facility: CLINIC | Age: 38
End: 2023-08-22
Payer: MEDICAID

## 2023-08-22 VITALS
WEIGHT: 158 LBS | OXYGEN SATURATION: 99 % | BODY MASS INDEX: 29.08 KG/M2 | HEIGHT: 62 IN | DIASTOLIC BLOOD PRESSURE: 80 MMHG | HEART RATE: 76 BPM | TEMPERATURE: 96.8 F | SYSTOLIC BLOOD PRESSURE: 114 MMHG

## 2023-08-22 LAB — SURGICAL PATHOLOGY STUDY: SIGNIFICANT CHANGE UP

## 2023-08-22 PROCEDURE — 99024 POSTOP FOLLOW-UP VISIT: CPT

## 2023-08-22 NOTE — PHYSICAL EXAM
[JVD] : no jugular venous distention  [Normal Thyroid] : the thyroid was normal [Carotid Bruits] : no carotid bruits [Normal Breath Sounds] : Normal breath sounds [Normal Heart Sounds] : normal heart sounds [Abdominal Masses] : No abdominal masses [Abdomen Tenderness] : ~T ~M No abdominal tenderness [Tender] : nontender [Enlarged] : not enlarged [No Rash or Lesion] : No rash or lesion [Purpura] : no purpura  [Petechiae] : no petechiae [Skin Ulcer] : no ulcer [Skin Induration] : no induration [Alert] : alert [Oriented to Person] : oriented to person [Oriented to Place] : oriented to place [Oriented to Time] : oriented to time [de-identified] : well groomed, no distress [de-identified] : left upper thigh incision healing well

## 2023-08-22 NOTE — ASSESSMENT
[FreeTextEntry1] : 37F who presented from Dr. 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   PLAN:  - CT chest to evaluate for metastases - OR for wide local excision to assure negative margins  the above plan of care with discussed in details to the patient and all questions were answered to patient satisfaction. patient instructed to follow up with the referring physician and patient primary care provider  A total of 60 minutes was spent on this visit, obtaining h/p,  reviewing previous notes/path, counseling the patient on coming procedure , ordering tests (below), and documenting the findings in the note.  8/22/23 Post op visit. Excision of left thigh sarcoma 8/9/23. Incision site clean, dry and intact. No erythema or dehiscence noted. Pathology reviewed with patient.  Final Diagnosis Skin and subcutaneous tissue, left thigh sarcoma, excision: - Dermal scar, surgical site changes and patchy fat necrosis. - No evidence of residual tumor. Comment The prior excisional biopsy specimen ([id number]; [id number]) is reviewed for comparison and . 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 For molecular genetic testing/Next Generation Sequencing, a tissue block has been request for case [id number]). The results will follow as addendum. Copy of report given to patient.

## 2023-08-22 NOTE — HISTORY OF PRESENT ILLNESS
[de-identified] : 37F who presented from Dr. Pate's office with pathology results of intermediate grade sarcoma.  She reports a L outer thigh lesion she first noticed in 2019. She thought it was a pimple at first. It has slightly enlarged in size. No associated pain.  She got it resected at her dermatologist's office. Pathology was notable for intermediate grade sarcoma.  No numbness, weakness, tingling with her LLE.  No fever, chills, weight loss.   PATHOLOGY L posterior thigh excision: pleomorphic soft tissue neoplasm Was difficult to classify, so sent to [facility] for 2nd opinion.  Addendum: superficial CD34-positive   PMH: anemia, COVID, ovarian cysts PSH: abdominoplasty 2013, laparoscopic sleeve gastrectomy ([facility] 2012) Meds: iron, MVI Smoking/Alcohol: none 3 children FAMILY HISTORY  Maternal aunt: uterine cancer, stomach cancer  8/22/23 post op visit

## 2023-09-18 ENCOUNTER — NON-APPOINTMENT (OUTPATIENT)
Age: 38
End: 2023-09-18

## 2023-09-20 PROBLEM — C49.9 MALIGNANT NEOPLASM OF CONNECTIVE AND SOFT TISSUE, UNSPECIFIED: Chronic | Status: ACTIVE | Noted: 2023-08-03

## 2023-09-20 PROBLEM — D64.9 ANEMIA, UNSPECIFIED: Chronic | Status: ACTIVE | Noted: 2023-08-03

## 2023-09-20 PROBLEM — N83.209 UNSPECIFIED OVARIAN CYST, UNSPECIFIED SIDE: Chronic | Status: ACTIVE | Noted: 2023-08-03

## 2023-09-20 PROBLEM — Z86.19 PERSONAL HISTORY OF OTHER INFECTIOUS AND PARASITIC DISEASES: Chronic | Status: ACTIVE | Noted: 2023-08-03

## 2023-09-29 ENCOUNTER — OUTPATIENT (OUTPATIENT)
Dept: OUTPATIENT SERVICES | Facility: HOSPITAL | Age: 38
LOS: 1 days | End: 2023-09-29
Payer: MEDICAID

## 2023-09-29 ENCOUNTER — NON-APPOINTMENT (OUTPATIENT)
Age: 38
End: 2023-09-29

## 2023-09-29 ENCOUNTER — APPOINTMENT (OUTPATIENT)
Dept: RADIATION ONCOLOGY | Facility: HOSPITAL | Age: 38
End: 2023-09-29
Payer: MEDICAID

## 2023-09-29 VITALS
HEART RATE: 73 BPM | TEMPERATURE: 98.6 F | WEIGHT: 162.13 LBS | RESPIRATION RATE: 16 BRPM | HEIGHT: 62 IN | OXYGEN SATURATION: 98 % | BODY MASS INDEX: 29.83 KG/M2 | SYSTOLIC BLOOD PRESSURE: 140 MMHG | DIASTOLIC BLOOD PRESSURE: 86 MMHG

## 2023-09-29 DIAGNOSIS — Z98.890 OTHER SPECIFIED POSTPROCEDURAL STATES: Chronic | ICD-10-CM

## 2023-09-29 DIAGNOSIS — Z98.891 HISTORY OF UTERINE SCAR FROM PREVIOUS SURGERY: Chronic | ICD-10-CM

## 2023-09-29 DIAGNOSIS — Z98.84 BARIATRIC SURGERY STATUS: Chronic | ICD-10-CM

## 2023-09-29 DIAGNOSIS — C49.9 MALIGNANT NEOPLASM OF CONNECTIVE AND SOFT TISSUE, UNSPECIFIED: ICD-10-CM

## 2023-09-29 PROCEDURE — 99203 OFFICE O/P NEW LOW 30 MIN: CPT

## 2023-10-02 ENCOUNTER — NON-APPOINTMENT (OUTPATIENT)
Age: 38
End: 2023-10-02

## 2023-10-02 ENCOUNTER — OUTPATIENT (OUTPATIENT)
Dept: OUTPATIENT SERVICES | Facility: HOSPITAL | Age: 38
LOS: 1 days | End: 2023-10-02
Payer: MEDICAID

## 2023-10-02 ENCOUNTER — APPOINTMENT (OUTPATIENT)
Dept: HEMATOLOGY ONCOLOGY | Facility: CLINIC | Age: 38
End: 2023-10-02
Payer: MEDICAID

## 2023-10-02 VITALS
BODY MASS INDEX: 29.63 KG/M2 | RESPIRATION RATE: 16 BRPM | WEIGHT: 161 LBS | HEIGHT: 62 IN | TEMPERATURE: 98.3 F | DIASTOLIC BLOOD PRESSURE: 78 MMHG | HEART RATE: 91 BPM | SYSTOLIC BLOOD PRESSURE: 131 MMHG

## 2023-10-02 DIAGNOSIS — Z80.8 FAMILY HISTORY OF MALIGNANT NEOPLASM OF OTHER ORGANS OR SYSTEMS: ICD-10-CM

## 2023-10-02 DIAGNOSIS — Z98.891 HISTORY OF UTERINE SCAR FROM PREVIOUS SURGERY: Chronic | ICD-10-CM

## 2023-10-02 DIAGNOSIS — Z98.84 BARIATRIC SURGERY STATUS: Chronic | ICD-10-CM

## 2023-10-02 DIAGNOSIS — Z98.890 OTHER SPECIFIED POSTPROCEDURAL STATES: Chronic | ICD-10-CM

## 2023-10-02 DIAGNOSIS — Z80.0 FAMILY HISTORY OF MALIGNANT NEOPLASM OF DIGESTIVE ORGANS: ICD-10-CM

## 2023-10-02 DIAGNOSIS — C49.9 MALIGNANT NEOPLASM OF CONNECTIVE AND SOFT TISSUE, UNSPECIFIED: ICD-10-CM

## 2023-10-02 DIAGNOSIS — Z86.2 PERSONAL HISTORY OF DISEASES OF THE BLOOD AND BLOOD-FORMING ORGANS AND CERTAIN DISORDERS INVOLVING THE IMMUNE MECHANISM: ICD-10-CM

## 2023-10-02 DIAGNOSIS — Z87.42 PERSONAL HISTORY OF OTHER DISEASES OF THE FEMALE GENITAL TRACT: ICD-10-CM

## 2023-10-02 PROCEDURE — 99204 OFFICE O/P NEW MOD 45 MIN: CPT

## 2023-10-03 DIAGNOSIS — C49.9 MALIGNANT NEOPLASM OF CONNECTIVE AND SOFT TISSUE, UNSPECIFIED: ICD-10-CM

## 2023-10-03 RX ORDER — FERROUS GLUCONATE 256(28)MG
325 TABLET ORAL
Refills: 0 | Status: ACTIVE | COMMUNITY

## 2023-10-03 RX ORDER — BIOTIN 10 MG
TABLET ORAL
Refills: 0 | Status: ACTIVE | COMMUNITY

## 2023-10-03 RX ORDER — ETONOGESTREL 68 MG/1
IMPLANT SUBCUTANEOUS
Refills: 0 | Status: ACTIVE | COMMUNITY

## 2023-10-05 DIAGNOSIS — C49.9 MALIGNANT NEOPLASM OF CONNECTIVE AND SOFT TISSUE, UNSPECIFIED: ICD-10-CM

## 2023-10-31 ENCOUNTER — APPOINTMENT (OUTPATIENT)
Dept: SURGERY | Facility: CLINIC | Age: 38
End: 2023-10-31
Payer: MEDICAID

## 2023-10-31 VITALS
OXYGEN SATURATION: 99 % | BODY MASS INDEX: 29.26 KG/M2 | HEART RATE: 108 BPM | DIASTOLIC BLOOD PRESSURE: 78 MMHG | HEIGHT: 62 IN | SYSTOLIC BLOOD PRESSURE: 116 MMHG | TEMPERATURE: 97 F | WEIGHT: 159 LBS

## 2023-10-31 PROCEDURE — 99214 OFFICE O/P EST MOD 30 MIN: CPT | Mod: 24

## 2023-11-10 ENCOUNTER — NON-APPOINTMENT (OUTPATIENT)
Age: 38
End: 2023-11-10

## 2023-11-13 ENCOUNTER — RESULT REVIEW (OUTPATIENT)
Age: 38
End: 2023-11-13

## 2023-11-13 ENCOUNTER — OUTPATIENT (OUTPATIENT)
Dept: OUTPATIENT SERVICES | Facility: HOSPITAL | Age: 38
LOS: 1 days | End: 2023-11-13
Payer: MEDICAID

## 2023-11-13 DIAGNOSIS — C49.9 MALIGNANT NEOPLASM OF CONNECTIVE AND SOFT TISSUE, UNSPECIFIED: ICD-10-CM

## 2023-11-13 DIAGNOSIS — Z98.890 OTHER SPECIFIED POSTPROCEDURAL STATES: Chronic | ICD-10-CM

## 2023-11-13 DIAGNOSIS — Z98.84 BARIATRIC SURGERY STATUS: Chronic | ICD-10-CM

## 2023-11-13 DIAGNOSIS — Z98.891 HISTORY OF UTERINE SCAR FROM PREVIOUS SURGERY: Chronic | ICD-10-CM

## 2023-11-13 PROCEDURE — 71260 CT THORAX DX C+: CPT

## 2023-11-13 PROCEDURE — 71260 CT THORAX DX C+: CPT | Mod: 26

## 2023-11-14 DIAGNOSIS — C49.9 MALIGNANT NEOPLASM OF CONNECTIVE AND SOFT TISSUE, UNSPECIFIED: ICD-10-CM

## 2023-11-24 ENCOUNTER — OUTPATIENT (OUTPATIENT)
Dept: OUTPATIENT SERVICES | Facility: HOSPITAL | Age: 38
LOS: 1 days | End: 2023-11-24
Payer: MEDICAID

## 2023-11-24 ENCOUNTER — RESULT REVIEW (OUTPATIENT)
Age: 38
End: 2023-11-24

## 2023-11-24 DIAGNOSIS — Z98.84 BARIATRIC SURGERY STATUS: Chronic | ICD-10-CM

## 2023-11-24 DIAGNOSIS — C49.9 MALIGNANT NEOPLASM OF CONNECTIVE AND SOFT TISSUE, UNSPECIFIED: ICD-10-CM

## 2023-11-24 DIAGNOSIS — Z98.890 OTHER SPECIFIED POSTPROCEDURAL STATES: Chronic | ICD-10-CM

## 2023-11-24 DIAGNOSIS — Z98.891 HISTORY OF UTERINE SCAR FROM PREVIOUS SURGERY: Chronic | ICD-10-CM

## 2023-11-24 PROCEDURE — 73722 MRI JOINT OF LWR EXTR W/DYE: CPT | Mod: 26,LT

## 2023-11-24 PROCEDURE — 73722 MRI JOINT OF LWR EXTR W/DYE: CPT | Mod: LT

## 2023-11-24 PROCEDURE — A9579: CPT

## 2023-11-25 ENCOUNTER — NON-APPOINTMENT (OUTPATIENT)
Age: 38
End: 2023-11-25

## 2023-11-25 DIAGNOSIS — C49.9 MALIGNANT NEOPLASM OF CONNECTIVE AND SOFT TISSUE, UNSPECIFIED: ICD-10-CM

## 2023-11-28 ENCOUNTER — APPOINTMENT (OUTPATIENT)
Dept: SURGERY | Facility: CLINIC | Age: 38
End: 2023-11-28
Payer: MEDICAID

## 2023-11-28 VITALS
DIASTOLIC BLOOD PRESSURE: 76 MMHG | BODY MASS INDEX: 28.89 KG/M2 | OXYGEN SATURATION: 99 % | HEART RATE: 88 BPM | WEIGHT: 157 LBS | HEIGHT: 62 IN | SYSTOLIC BLOOD PRESSURE: 122 MMHG | TEMPERATURE: 97 F

## 2023-11-28 PROCEDURE — 99215 OFFICE O/P EST HI 40 MIN: CPT

## 2023-11-30 ENCOUNTER — NON-APPOINTMENT (OUTPATIENT)
Age: 38
End: 2023-11-30

## 2023-12-01 ENCOUNTER — NON-APPOINTMENT (OUTPATIENT)
Age: 38
End: 2023-12-01

## 2023-12-02 LAB
CANCER AG125 SERPL-ACNC: 9 U/ML
CANCER AG15-3 SERPL-ACNC: 18.4 U/ML
CANCER AG19-9 SERPL-ACNC: 5 U/ML
CEA SERPL-MCNC: 0.9 NG/ML

## 2023-12-09 ENCOUNTER — RESULT REVIEW (OUTPATIENT)
Age: 38
End: 2023-12-09

## 2023-12-09 ENCOUNTER — OUTPATIENT (OUTPATIENT)
Dept: OUTPATIENT SERVICES | Facility: HOSPITAL | Age: 38
LOS: 1 days | End: 2023-12-09
Payer: MEDICAID

## 2023-12-09 DIAGNOSIS — Z98.890 OTHER SPECIFIED POSTPROCEDURAL STATES: Chronic | ICD-10-CM

## 2023-12-09 DIAGNOSIS — Z98.84 BARIATRIC SURGERY STATUS: Chronic | ICD-10-CM

## 2023-12-09 DIAGNOSIS — Z98.891 HISTORY OF UTERINE SCAR FROM PREVIOUS SURGERY: Chronic | ICD-10-CM

## 2023-12-09 DIAGNOSIS — Z12.31 ENCOUNTER FOR SCREENING MAMMOGRAM FOR MALIGNANT NEOPLASM OF BREAST: ICD-10-CM

## 2023-12-09 PROCEDURE — 77067 SCR MAMMO BI INCL CAD: CPT | Mod: 26

## 2023-12-09 PROCEDURE — 77067 SCR MAMMO BI INCL CAD: CPT

## 2023-12-09 PROCEDURE — 77063 BREAST TOMOSYNTHESIS BI: CPT

## 2023-12-09 PROCEDURE — 77063 BREAST TOMOSYNTHESIS BI: CPT | Mod: 26

## 2023-12-10 DIAGNOSIS — Z12.31 ENCOUNTER FOR SCREENING MAMMOGRAM FOR MALIGNANT NEOPLASM OF BREAST: ICD-10-CM

## 2023-12-12 ENCOUNTER — APPOINTMENT (OUTPATIENT)
Dept: SURGERY | Facility: CLINIC | Age: 38
End: 2023-12-12
Payer: MEDICAID

## 2023-12-12 VITALS
DIASTOLIC BLOOD PRESSURE: 80 MMHG | BODY MASS INDEX: 29.63 KG/M2 | HEIGHT: 62 IN | SYSTOLIC BLOOD PRESSURE: 120 MMHG | WEIGHT: 161 LBS

## 2023-12-12 PROCEDURE — 99215 OFFICE O/P EST HI 40 MIN: CPT

## 2023-12-17 NOTE — REASON FOR VISIT
[Follow-Up Visit] : a follow-up visit for [Post Op: _________] : a [unfilled] post op visit [FreeTextEntry1] : L thigh sarcoma

## 2023-12-17 NOTE — HISTORY OF PRESENT ILLNESS
[de-identified] : 37F who presented from Dr. Pate's office with pathology results of intermediate grade sarcoma.  She reports a L outer thigh lesion she first noticed in 2019. She thought it was a pimple at first. It has slightly enlarged in size. No associated pain.  She got it resected at her dermatologist's office. Pathology was notable for intermediate grade sarcoma.  No numbness, weakness, tingling with her LLE.  No fever, chills, weight loss.   PATHOLOGY L posterior thigh excision: pleomorphic soft tissue neoplasm Was difficult to classify, so sent to [facility] for 2nd opinion.  Addendum: superficial CD34-positive   PMH: anemia, COVID, ovarian cysts PSH: abdominoplasty 2013, laparoscopic sleeve gastrectomy (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

## 2023-12-17 NOTE — PHYSICAL EXAM
[JVD] : no jugular venous distention  [Normal Thyroid] : the thyroid was normal [Carotid Bruits] : no carotid bruits [Normal Breath Sounds] : Normal breath sounds [Normal Heart Sounds] : normal heart sounds [Abdominal Masses] : No abdominal masses [Abdomen Tenderness] : ~T ~M No abdominal tenderness [Tender] : nontender [Enlarged] : not enlarged [No Rash or Lesion] : No rash or lesion [Purpura] : no purpura  [Petechiae] : no petechiae [Skin Ulcer] : no ulcer [Skin Induration] : no induration [Alert] : alert [Oriented to Person] : oriented to person [Oriented to Place] : oriented to place [Oriented to Time] : oriented to time [de-identified] : well groomed, no distress [de-identified] : left upper thigh incision healing well

## 2023-12-17 NOTE — ASSESSMENT
[FreeTextEntry1] : Sarcoma (171.9) (C49.9) 37F who presented from Dr. 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  8/22/23 Post op visit. Excision of left thigh sarcoma 8/9/23. Incision site clean, dry and intact. No erythema or dehiscence noted. Pathology reviewed with patient.  Final Diagnosis Skin and subcutaneous tissue, left thigh sarcoma, excision: - Dermal scar, surgical site changes and patchy fat necrosis. - No evidence of residual tumor. Comment The prior excisional biopsy specimen ([id number]; [id number]) is reviewed for comparison and . 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 For molecular genetic testing/Next Generation Sequencing, a tissue block has been request for case [id number]). The results will follow as addendum. Copy of report given to patient.    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 patient instructed to follow up with the referring physician and patient primary care provider   A total of 45 minutes was spent on this visit, obtaining h/p,  reviewing previous notes/imaging, counseling the patient on coming surgery f/u , ordering tests (below), and documenting the findings in the note.

## 2023-12-26 ENCOUNTER — EMERGENCY (EMERGENCY)
Facility: HOSPITAL | Age: 38
LOS: 0 days | Discharge: LEFT BEFORE DISCHARGE | End: 2023-12-26
Attending: EMERGENCY MEDICINE
Payer: MEDICAID

## 2023-12-26 ENCOUNTER — TRANSCRIPTION ENCOUNTER (OUTPATIENT)
Age: 38
End: 2023-12-26

## 2023-12-26 ENCOUNTER — NON-APPOINTMENT (OUTPATIENT)
Age: 38
End: 2023-12-26

## 2023-12-26 VITALS
RESPIRATION RATE: 18 BRPM | HEART RATE: 92 BPM | DIASTOLIC BLOOD PRESSURE: 79 MMHG | OXYGEN SATURATION: 98 % | SYSTOLIC BLOOD PRESSURE: 138 MMHG | TEMPERATURE: 97 F

## 2023-12-26 DIAGNOSIS — Z87.42 PERSONAL HISTORY OF OTHER DISEASES OF THE FEMALE GENITAL TRACT: ICD-10-CM

## 2023-12-26 DIAGNOSIS — Z53.29 PROCEDURE AND TREATMENT NOT CARRIED OUT BECAUSE OF PATIENT'S DECISION FOR OTHER REASONS: ICD-10-CM

## 2023-12-26 DIAGNOSIS — Z88.6 ALLERGY STATUS TO ANALGESIC AGENT: ICD-10-CM

## 2023-12-26 DIAGNOSIS — N92.0 EXCESSIVE AND FREQUENT MENSTRUATION WITH REGULAR CYCLE: ICD-10-CM

## 2023-12-26 DIAGNOSIS — Z98.890 OTHER SPECIFIED POSTPROCEDURAL STATES: Chronic | ICD-10-CM

## 2023-12-26 DIAGNOSIS — R10.2 PELVIC AND PERINEAL PAIN: ICD-10-CM

## 2023-12-26 DIAGNOSIS — K80.20 CALCULUS OF GALLBLADDER WITHOUT CHOLECYSTITIS WITHOUT OBSTRUCTION: ICD-10-CM

## 2023-12-26 DIAGNOSIS — N83.202 UNSPECIFIED OVARIAN CYST, LEFT SIDE: ICD-10-CM

## 2023-12-26 DIAGNOSIS — Z98.84 BARIATRIC SURGERY STATUS: Chronic | ICD-10-CM

## 2023-12-26 DIAGNOSIS — Z98.84 BARIATRIC SURGERY STATUS: ICD-10-CM

## 2023-12-26 DIAGNOSIS — Z98.891 HISTORY OF UTERINE SCAR FROM PREVIOUS SURGERY: Chronic | ICD-10-CM

## 2023-12-26 LAB
APPEARANCE UR: ABNORMAL
APPEARANCE UR: ABNORMAL
BACTERIA # UR AUTO: ABNORMAL /HPF
BACTERIA # UR AUTO: ABNORMAL /HPF
BILIRUB UR-MCNC: NEGATIVE — SIGNIFICANT CHANGE UP
BILIRUB UR-MCNC: NEGATIVE — SIGNIFICANT CHANGE UP
CAST: 0 /LPF — SIGNIFICANT CHANGE UP (ref 0–4)
CAST: 0 /LPF — SIGNIFICANT CHANGE UP (ref 0–4)
COLOR SPEC: YELLOW — SIGNIFICANT CHANGE UP
COLOR SPEC: YELLOW — SIGNIFICANT CHANGE UP
DIFF PNL FLD: NEGATIVE — SIGNIFICANT CHANGE UP
DIFF PNL FLD: NEGATIVE — SIGNIFICANT CHANGE UP
GLUCOSE UR QL: NEGATIVE MG/DL — SIGNIFICANT CHANGE UP
GLUCOSE UR QL: NEGATIVE MG/DL — SIGNIFICANT CHANGE UP
KETONES UR-MCNC: ABNORMAL MG/DL
KETONES UR-MCNC: ABNORMAL MG/DL
LEUKOCYTE ESTERASE UR-ACNC: NEGATIVE — SIGNIFICANT CHANGE UP
LEUKOCYTE ESTERASE UR-ACNC: NEGATIVE — SIGNIFICANT CHANGE UP
NITRITE UR-MCNC: NEGATIVE — SIGNIFICANT CHANGE UP
NITRITE UR-MCNC: NEGATIVE — SIGNIFICANT CHANGE UP
PH UR: 7.5 — SIGNIFICANT CHANGE UP (ref 5–8)
PH UR: 7.5 — SIGNIFICANT CHANGE UP (ref 5–8)
PROT UR-MCNC: NEGATIVE MG/DL — SIGNIFICANT CHANGE UP
PROT UR-MCNC: NEGATIVE MG/DL — SIGNIFICANT CHANGE UP
RBC CASTS # UR COMP ASSIST: 1 /HPF — SIGNIFICANT CHANGE UP (ref 0–4)
RBC CASTS # UR COMP ASSIST: 1 /HPF — SIGNIFICANT CHANGE UP (ref 0–4)
SP GR SPEC: 1.02 — SIGNIFICANT CHANGE UP (ref 1–1.03)
SP GR SPEC: 1.02 — SIGNIFICANT CHANGE UP (ref 1–1.03)
SQUAMOUS # UR AUTO: 13 /HPF — HIGH (ref 0–5)
SQUAMOUS # UR AUTO: 13 /HPF — HIGH (ref 0–5)
UROBILINOGEN FLD QL: 1 MG/DL — SIGNIFICANT CHANGE UP (ref 0.2–1)
UROBILINOGEN FLD QL: 1 MG/DL — SIGNIFICANT CHANGE UP (ref 0.2–1)
WBC UR QL: 6 /HPF — HIGH (ref 0–5)
WBC UR QL: 6 /HPF — HIGH (ref 0–5)

## 2023-12-26 PROCEDURE — 87086 URINE CULTURE/COLONY COUNT: CPT

## 2023-12-26 PROCEDURE — 76830 TRANSVAGINAL US NON-OB: CPT | Mod: 26

## 2023-12-26 PROCEDURE — 99284 EMERGENCY DEPT VISIT MOD MDM: CPT | Mod: 25

## 2023-12-26 PROCEDURE — 99284 EMERGENCY DEPT VISIT MOD MDM: CPT

## 2023-12-26 PROCEDURE — 76830 TRANSVAGINAL US NON-OB: CPT

## 2023-12-26 PROCEDURE — 81001 URINALYSIS AUTO W/SCOPE: CPT

## 2023-12-26 RX ORDER — ACETAMINOPHEN 500 MG
650 TABLET ORAL ONCE
Refills: 0 | Status: COMPLETED | OUTPATIENT
Start: 2023-12-26 | End: 2023-12-26

## 2023-12-26 RX ADMIN — Medication 650 MILLIGRAM(S): at 15:14

## 2023-12-26 NOTE — ED PROVIDER NOTE - PHYSICAL EXAMINATION
Vital Signs: I have reviewed the initial vital signs.  Constitutional: well-nourished, appears stated age, no acute distress  HEENT: PERRL, pink conjunctivae, mmm  Cardiovascular: regular rate, regular rhythm, well-perfused extremities  Respiratory: unlabored respiratory effort, clear to auscultation bilaterally, speaking full sentences  Gastrointestinal: soft, non-distended abdomen, mild suprapubic tenderness to palpation without rebound or guarding, no palpable mass. no CVA ttp  Musculoskeletal: supple neck, no lower extremity edema, no midline spine ttp  Integumentary: warm, dry, no rash  Neurologic: awake, alert, cranial nerves  grossly intact, extremities’ motor and sensory functions grossly intact  Psychiatric: appropriate mood, appropriate affect

## 2023-12-26 NOTE — ED ADULT NURSE NOTE - COVID-19 ORDERING FACILITY
[facility]  - [facility]  - [facility]

## 2023-12-26 NOTE — ED PROVIDER NOTE - OBJECTIVE STATEMENT
38-year-old female past medical history of bariatric surgery, ovarian cyst and  gallstones presenting for evaluation of intermittent left-sided pelvic pain and vaginal spotting for the past 2 days.  Patient is scheduled to have elective cholecystectomy next month with Dr Tejada.  She was recently diagnosed with a large ovarian cyst by her OB/GYN at [facility].  Patient states that she developed a left-sided pelvic pain on 12/24, it resolved after couple of hours, she developed similar pain yesterday, called her OB-GYN , but she is on vacations, and came into the emergency room for evaluation.  Patient took 2 tablets of Motrin before coming to ED, feeling better now.  Pain is nonradiating, no associated fever or chills, flank or back pain, dysuria or urgency, no nausea vomiting, diarrhea or any other associated complaints.  Patient is not concerned for STIs when asked about specifically. Plan: Tylenol, UA/UCG, pelvic sono, reassess.  Patient is amenable to plan. 38-year-old female past medical history of bariatric surgery, ovarian cyst and  gallstones presenting for evaluation of intermittent left-sided pelvic pain and vaginal spotting for the past 2 days.  Patient is scheduled to have elective cholecystectomy next month with Dr Tejada.  She was recently diagnosed with a large ovarian cyst by her OB/GYN at [facility].  Patient states that she developed a left-sided pelvic pain on 12/24, it resolved after couple of hours, she developed similar pain yesterday, called her OB-GYN , but she is on vacations, and came into the emergency room for evaluation.  Patient took 2 tablets of Motrin before coming to ED, feeling better now.  Pain is nonradiating, no associated fever or chills, flank or back pain, dysuria or urgency, no nausea vomiting, diarrhea or any other associated complaints.  Patient is not concerned for STIs when asked about specifically. Plan: Tylenol, UA/UCG, pelvic sono, reassess.  Patient is amenable to plan.

## 2023-12-26 NOTE — ED PROVIDER NOTE - CLINICAL SUMMARY MEDICAL DECISION MAKING FREE TEXT BOX
38-year-old female history of left ovarian cyst presented for evaluation of pelvic pain and vaginal spotting.  Vital signs were reviewed and are normal.  Patient appears very well.  She was given dose of analgesia in ED, UA was negative.  Pelvic sono was ordered and shows a ruptured left ovarian cyst.  Patient left the ED prior to results of pelvic sono.  She was advised to look up the result on follow [employer] pepper.  Strict return Precautions given. 38-year-old female history of left ovarian cyst presented for evaluation of pelvic pain and vaginal spotting.  Vital signs were reviewed and are normal.  Patient appears very well.  She was given dose of analgesia in ED, UA was negative.  Pelvic sono was ordered and shows a ruptured left ovarian cyst.  Patient left the ED prior to results of pelvic sono.  She was advised to look up the result on follow [employer] pepper.  Strict return Precautions given.

## 2023-12-26 NOTE — ED PROVIDER NOTE - COVID-19 ORDERING FACILITY
[facility]  - [facility]  - [facility]

## 2023-12-27 ENCOUNTER — OUTPATIENT (OUTPATIENT)
Dept: OUTPATIENT SERVICES | Facility: HOSPITAL | Age: 38
LOS: 1 days | End: 2023-12-27
Payer: MEDICAID

## 2023-12-27 VITALS
DIASTOLIC BLOOD PRESSURE: 73 MMHG | WEIGHT: 158.73 LBS | HEIGHT: 62 IN | OXYGEN SATURATION: 100 % | RESPIRATION RATE: 18 BRPM | TEMPERATURE: 98 F | SYSTOLIC BLOOD PRESSURE: 122 MMHG | HEART RATE: 84 BPM

## 2023-12-27 DIAGNOSIS — K80.20 CALCULUS OF GALLBLADDER WITHOUT CHOLECYSTITIS WITHOUT OBSTRUCTION: ICD-10-CM

## 2023-12-27 DIAGNOSIS — Z98.84 BARIATRIC SURGERY STATUS: Chronic | ICD-10-CM

## 2023-12-27 DIAGNOSIS — Z98.890 OTHER SPECIFIED POSTPROCEDURAL STATES: Chronic | ICD-10-CM

## 2023-12-27 DIAGNOSIS — Z01.818 ENCOUNTER FOR OTHER PREPROCEDURAL EXAMINATION: ICD-10-CM

## 2023-12-27 DIAGNOSIS — Z98.891 HISTORY OF UTERINE SCAR FROM PREVIOUS SURGERY: Chronic | ICD-10-CM

## 2023-12-27 LAB
ALBUMIN SERPL ELPH-MCNC: 4.7 G/DL — SIGNIFICANT CHANGE UP (ref 3.5–5.2)
ALBUMIN SERPL ELPH-MCNC: 4.7 G/DL — SIGNIFICANT CHANGE UP (ref 3.5–5.2)
ALP SERPL-CCNC: 74 U/L — SIGNIFICANT CHANGE UP (ref 30–115)
ALP SERPL-CCNC: 74 U/L — SIGNIFICANT CHANGE UP (ref 30–115)
ALT FLD-CCNC: 5 U/L — SIGNIFICANT CHANGE UP (ref 0–41)
ALT FLD-CCNC: 5 U/L — SIGNIFICANT CHANGE UP (ref 0–41)
ANION GAP SERPL CALC-SCNC: 13 MMOL/L — SIGNIFICANT CHANGE UP (ref 7–14)
ANION GAP SERPL CALC-SCNC: 13 MMOL/L — SIGNIFICANT CHANGE UP (ref 7–14)
APTT BLD: 31.4 SEC — SIGNIFICANT CHANGE UP (ref 27–39.2)
APTT BLD: 31.4 SEC — SIGNIFICANT CHANGE UP (ref 27–39.2)
AST SERPL-CCNC: 15 U/L — SIGNIFICANT CHANGE UP (ref 0–41)
AST SERPL-CCNC: 15 U/L — SIGNIFICANT CHANGE UP (ref 0–41)
BASOPHILS # BLD AUTO: 0.05 K/UL — SIGNIFICANT CHANGE UP (ref 0–0.2)
BASOPHILS # BLD AUTO: 0.05 K/UL — SIGNIFICANT CHANGE UP (ref 0–0.2)
BASOPHILS NFR BLD AUTO: 0.6 % — SIGNIFICANT CHANGE UP (ref 0–1)
BASOPHILS NFR BLD AUTO: 0.6 % — SIGNIFICANT CHANGE UP (ref 0–1)
BILIRUB SERPL-MCNC: 0.3 MG/DL — SIGNIFICANT CHANGE UP (ref 0.2–1.2)
BILIRUB SERPL-MCNC: 0.3 MG/DL — SIGNIFICANT CHANGE UP (ref 0.2–1.2)
BUN SERPL-MCNC: 6 MG/DL — LOW (ref 10–20)
BUN SERPL-MCNC: 6 MG/DL — LOW (ref 10–20)
CALCIUM SERPL-MCNC: 9.4 MG/DL — SIGNIFICANT CHANGE UP (ref 8.4–10.5)
CALCIUM SERPL-MCNC: 9.4 MG/DL — SIGNIFICANT CHANGE UP (ref 8.4–10.5)
CHLORIDE SERPL-SCNC: 102 MMOL/L — SIGNIFICANT CHANGE UP (ref 98–110)
CHLORIDE SERPL-SCNC: 102 MMOL/L — SIGNIFICANT CHANGE UP (ref 98–110)
CO2 SERPL-SCNC: 23 MMOL/L — SIGNIFICANT CHANGE UP (ref 17–32)
CO2 SERPL-SCNC: 23 MMOL/L — SIGNIFICANT CHANGE UP (ref 17–32)
CREAT SERPL-MCNC: 0.8 MG/DL — SIGNIFICANT CHANGE UP (ref 0.7–1.5)
CREAT SERPL-MCNC: 0.8 MG/DL — SIGNIFICANT CHANGE UP (ref 0.7–1.5)
CULTURE RESULTS: SIGNIFICANT CHANGE UP
CULTURE RESULTS: SIGNIFICANT CHANGE UP
EGFR: 98 ML/MIN/1.73M2 — SIGNIFICANT CHANGE UP
EGFR: 98 ML/MIN/1.73M2 — SIGNIFICANT CHANGE UP
EOSINOPHIL # BLD AUTO: 0.11 K/UL — SIGNIFICANT CHANGE UP (ref 0–0.7)
EOSINOPHIL # BLD AUTO: 0.11 K/UL — SIGNIFICANT CHANGE UP (ref 0–0.7)
EOSINOPHIL NFR BLD AUTO: 1.3 % — SIGNIFICANT CHANGE UP (ref 0–8)
EOSINOPHIL NFR BLD AUTO: 1.3 % — SIGNIFICANT CHANGE UP (ref 0–8)
GLUCOSE SERPL-MCNC: 73 MG/DL — SIGNIFICANT CHANGE UP (ref 70–99)
GLUCOSE SERPL-MCNC: 73 MG/DL — SIGNIFICANT CHANGE UP (ref 70–99)
HCT VFR BLD CALC: 38.4 % — SIGNIFICANT CHANGE UP (ref 37–47)
HCT VFR BLD CALC: 38.4 % — SIGNIFICANT CHANGE UP (ref 37–47)
HGB BLD-MCNC: 12.8 G/DL — SIGNIFICANT CHANGE UP (ref 12–16)
HGB BLD-MCNC: 12.8 G/DL — SIGNIFICANT CHANGE UP (ref 12–16)
IMM GRANULOCYTES NFR BLD AUTO: 0.2 % — SIGNIFICANT CHANGE UP (ref 0.1–0.3)
IMM GRANULOCYTES NFR BLD AUTO: 0.2 % — SIGNIFICANT CHANGE UP (ref 0.1–0.3)
INR BLD: 1.04 RATIO — SIGNIFICANT CHANGE UP (ref 0.65–1.3)
INR BLD: 1.04 RATIO — SIGNIFICANT CHANGE UP (ref 0.65–1.3)
LYMPHOCYTES # BLD AUTO: 2.81 K/UL — SIGNIFICANT CHANGE UP (ref 1.2–3.4)
LYMPHOCYTES # BLD AUTO: 2.81 K/UL — SIGNIFICANT CHANGE UP (ref 1.2–3.4)
LYMPHOCYTES # BLD AUTO: 34.4 % — SIGNIFICANT CHANGE UP (ref 20.5–51.1)
LYMPHOCYTES # BLD AUTO: 34.4 % — SIGNIFICANT CHANGE UP (ref 20.5–51.1)
MCHC RBC-ENTMCNC: 28.1 PG — SIGNIFICANT CHANGE UP (ref 27–31)
MCHC RBC-ENTMCNC: 28.1 PG — SIGNIFICANT CHANGE UP (ref 27–31)
MCHC RBC-ENTMCNC: 33.3 G/DL — SIGNIFICANT CHANGE UP (ref 32–37)
MCHC RBC-ENTMCNC: 33.3 G/DL — SIGNIFICANT CHANGE UP (ref 32–37)
MCV RBC AUTO: 84.4 FL — SIGNIFICANT CHANGE UP (ref 81–99)
MCV RBC AUTO: 84.4 FL — SIGNIFICANT CHANGE UP (ref 81–99)
MONOCYTES # BLD AUTO: 0.61 K/UL — HIGH (ref 0.1–0.6)
MONOCYTES # BLD AUTO: 0.61 K/UL — HIGH (ref 0.1–0.6)
MONOCYTES NFR BLD AUTO: 7.5 % — SIGNIFICANT CHANGE UP (ref 1.7–9.3)
MONOCYTES NFR BLD AUTO: 7.5 % — SIGNIFICANT CHANGE UP (ref 1.7–9.3)
NEUTROPHILS # BLD AUTO: 4.57 K/UL — SIGNIFICANT CHANGE UP (ref 1.4–6.5)
NEUTROPHILS # BLD AUTO: 4.57 K/UL — SIGNIFICANT CHANGE UP (ref 1.4–6.5)
NEUTROPHILS NFR BLD AUTO: 56 % — SIGNIFICANT CHANGE UP (ref 42.2–75.2)
NEUTROPHILS NFR BLD AUTO: 56 % — SIGNIFICANT CHANGE UP (ref 42.2–75.2)
NRBC # BLD: 0 /100 WBCS — SIGNIFICANT CHANGE UP (ref 0–0)
NRBC # BLD: 0 /100 WBCS — SIGNIFICANT CHANGE UP (ref 0–0)
PLATELET # BLD AUTO: 251 K/UL — SIGNIFICANT CHANGE UP (ref 130–400)
PLATELET # BLD AUTO: 251 K/UL — SIGNIFICANT CHANGE UP (ref 130–400)
PMV BLD: 10.8 FL — HIGH (ref 7.4–10.4)
PMV BLD: 10.8 FL — HIGH (ref 7.4–10.4)
POTASSIUM SERPL-MCNC: 4.4 MMOL/L — SIGNIFICANT CHANGE UP (ref 3.5–5)
POTASSIUM SERPL-MCNC: 4.4 MMOL/L — SIGNIFICANT CHANGE UP (ref 3.5–5)
POTASSIUM SERPL-SCNC: 4.4 MMOL/L — SIGNIFICANT CHANGE UP (ref 3.5–5)
POTASSIUM SERPL-SCNC: 4.4 MMOL/L — SIGNIFICANT CHANGE UP (ref 3.5–5)
PROT SERPL-MCNC: 7.5 G/DL — SIGNIFICANT CHANGE UP (ref 6–8)
PROT SERPL-MCNC: 7.5 G/DL — SIGNIFICANT CHANGE UP (ref 6–8)
PROTHROM AB SERPL-ACNC: 11.9 SEC — SIGNIFICANT CHANGE UP (ref 9.95–12.87)
PROTHROM AB SERPL-ACNC: 11.9 SEC — SIGNIFICANT CHANGE UP (ref 9.95–12.87)
RBC # BLD: 4.55 M/UL — SIGNIFICANT CHANGE UP (ref 4.2–5.4)
RBC # BLD: 4.55 M/UL — SIGNIFICANT CHANGE UP (ref 4.2–5.4)
RBC # FLD: 13.5 % — SIGNIFICANT CHANGE UP (ref 11.5–14.5)
RBC # FLD: 13.5 % — SIGNIFICANT CHANGE UP (ref 11.5–14.5)
SODIUM SERPL-SCNC: 138 MMOL/L — SIGNIFICANT CHANGE UP (ref 135–146)
SODIUM SERPL-SCNC: 138 MMOL/L — SIGNIFICANT CHANGE UP (ref 135–146)
SPECIMEN SOURCE: SIGNIFICANT CHANGE UP
SPECIMEN SOURCE: SIGNIFICANT CHANGE UP
WBC # BLD: 8.17 K/UL — SIGNIFICANT CHANGE UP (ref 4.8–10.8)
WBC # BLD: 8.17 K/UL — SIGNIFICANT CHANGE UP (ref 4.8–10.8)
WBC # FLD AUTO: 8.17 K/UL — SIGNIFICANT CHANGE UP (ref 4.8–10.8)
WBC # FLD AUTO: 8.17 K/UL — SIGNIFICANT CHANGE UP (ref 4.8–10.8)

## 2023-12-27 PROCEDURE — 80053 COMPREHEN METABOLIC PANEL: CPT

## 2023-12-27 PROCEDURE — 85730 THROMBOPLASTIN TIME PARTIAL: CPT

## 2023-12-27 PROCEDURE — 85025 COMPLETE CBC W/AUTO DIFF WBC: CPT

## 2023-12-27 PROCEDURE — 85610 PROTHROMBIN TIME: CPT

## 2023-12-27 PROCEDURE — 36415 COLL VENOUS BLD VENIPUNCTURE: CPT

## 2023-12-27 PROCEDURE — 99214 OFFICE O/P EST MOD 30 MIN: CPT | Mod: 25

## 2023-12-27 RX ORDER — VALACYCLOVIR 500 MG/1
1 TABLET, FILM COATED ORAL
Refills: 0 | DISCHARGE

## 2023-12-27 NOTE — H&P PST ADULT - HISTORY OF PRESENT ILLNESS
Patient is a 85 year old female presenting to PAST in preparation for  ROBOTIC POSSIBLE LAPAROSCOPIC POSSIBLE OPEN CHOLECYSTECTOMY on 1/10 under gen anesthesia by Dr. Tejada  PATIENT CURRENTLY DENIES CHEST PAIN  SHORTNESS OF BREATH  PALPITATIONS,  DYSURIA, OR UPPER RESPIRATORY INFECTION IN PAST 2 WEEKS    Anesthesia Alert  NO--Difficult Airway  NO--History of neck surgery or radiation  NO--Limited ROM of neck  NO--History of Malignant hyperthermia  NO--Personal or family history of Pseudocholinesterase deficiency  NO--Prior Anesthesia Complication  NO--Latex Allergy  NO--Loose teeth  NO--History of Rheumatoid Arthritis  NO--LAY  NO-- BLEEDING RISK  NO--Other_____    As per patient, this is their complete medical and surgical history, including medications both prescribed or over the counter.  Patient verbalized understanding of instructions and was given the opportunity to ask questions and have them answered.   Patient is a 85 year old female presenting to PAST in preparation for  ROBOTIC POSSIBLE LAPAROSCOPIC POSSIBLE OPEN CHOLECYSTECTOMY on 1/10 under gen anesthesia by Dr. Tejada  Reports h/o cholelithiasis has had abd pain x2 month, was seen by pmd who sent her for sono . The sono revealed gallstones , has been advised to have above  PATIENT CURRENTLY DENIES CHEST PAIN  SHORTNESS OF BREATH  PALPITATIONS,  DYSURIA, OR UPPER RESPIRATORY INFECTION IN PAST 2 WEEKS    Anesthesia Alert  NO--Difficult Airway  NO--History of neck surgery or radiation  NO--Limited ROM of neck  NO--History of Malignant hyperthermia  NO--Personal or family history of Pseudocholinesterase deficiency  NO--Prior Anesthesia Complication  NO--Latex Allergy  NO--Loose teeth  NO--History of Rheumatoid Arthritis  NO--LAY  NO-- BLEEDING RISK  NO--Other_____      Duke Activity Status Index (DASI) from [employer]  on 12/27/2023      RESULT SUMMARY:  58.2 points  The higher the score (maximum 58.2), the higher the functional status.    9.89 METs        INPUTS:  Take care of self —> 2.75 = Yes  Walk indoors —> 1.75 = Yes  Walk 1&ndash;2 blocks on level ground —> 2.75 = Yes  Climb a flight of stairs or walk up a hill —> 5.5 = Yes  Run a short distance —> 8 = Yes  Do light work around the house —> 2.7 = Yes  Do moderate work around the house —> 3.5 = Yes  Do heavy work around the house —> 8 = Yes  Do yardwork —> 4.5 = Yes  Have sexual relations —> 5.25 = Yes  Participate in moderate recreational activities —> 6 = Yes  Participate in strenuous sports —> 7.5 = Yes    Revised Cardiac Risk Index for Pre-Operative Risk from [employer]  on 12/27/2023      RESULT SUMMARY:  0 points  Class I Risk    3.9 %  30-day risk of death, MI, or cardiac arrest    From [employer] 2017. These numbers are higher than those from the original study (Bimal 1999). See Evidence for details.      INPUTS:  Elevated-risk surgery —> 0 = No  History of ischemic heart disease —> 0 = No  History of congestive heart failure —> 0 = No  History of cerebrovascular disease —> 0 = No  Pre-operative treatment with insulin —> 0 = No  Pre-operative creatinine >2 mg/dL / 176.8 µmol/L —> 0 = No      As per patient, this is their complete medical and surgical history, including medications both prescribed or over the counter.  Patient verbalized understanding of instructions and was given the opportunity to ask questions and have them answered.   Patient is a 85 year old female presenting to PAST in preparation for  ROBOTIC POSSIBLE LAPAROSCOPIC POSSIBLE OPEN CHOLECYSTECTOMY on 1/10 under gen anesthesia by Dr. eTjada  Reports h/o cholelithiasis has had abd pain x2 month, was seen by pmd who sent her for sono . The sono revealed gallstones , has been advised to have above  PATIENT CURRENTLY DENIES CHEST PAIN  SHORTNESS OF BREATH  PALPITATIONS,  DYSURIA, OR UPPER RESPIRATORY INFECTION IN PAST 2 WEEKS    Anesthesia Alert  NO--Difficult Airway  NO--History of neck surgery or radiation  NO--Limited ROM of neck  NO--History of Malignant hyperthermia  NO--Personal or family history of Pseudocholinesterase deficiency  NO--Prior Anesthesia Complication  NO--Latex Allergy  NO--Loose teeth  NO--History of Rheumatoid Arthritis  NO--LAY  NO-- BLEEDING RISK  NO--Other_____      Duke Activity Status Index (DASI) from [employer]  on 12/27/2023      RESULT SUMMARY:  58.2 points  The higher the score (maximum 58.2), the higher the functional status.    9.89 METs        INPUTS:  Take care of self —> 2.75 = Yes  Walk indoors —> 1.75 = Yes  Walk 1&ndash;2 blocks on level ground —> 2.75 = Yes  Climb a flight of stairs or walk up a hill —> 5.5 = Yes  Run a short distance —> 8 = Yes  Do light work around the house —> 2.7 = Yes  Do moderate work around the house —> 3.5 = Yes  Do heavy work around the house —> 8 = Yes  Do yardwork —> 4.5 = Yes  Have sexual relations —> 5.25 = Yes  Participate in moderate recreational activities —> 6 = Yes  Participate in strenuous sports —> 7.5 = Yes    Revised Cardiac Risk Index for Pre-Operative Risk from [employer]  on 12/27/2023      RESULT SUMMARY:  0 points  Class I Risk    3.9 %  30-day risk of death, MI, or cardiac arrest    From [employer] 2017. These numbers are higher than those from the original study (Bimal 1999). See Evidence for details.      INPUTS:  Elevated-risk surgery —> 0 = No  History of ischemic heart disease —> 0 = No  History of congestive heart failure —> 0 = No  History of cerebrovascular disease —> 0 = No  Pre-operative treatment with insulin —> 0 = No  Pre-operative creatinine >2 mg/dL / 176.8 µmol/L —> 0 = No      As per patient, this is their complete medical and surgical history, including medications both prescribed or over the counter.  Patient verbalized understanding of instructions and was given the opportunity to ask questions and have them answered.

## 2023-12-27 NOTE — H&P PST ADULT - NSICDXPASTMEDICALHX_GEN_ALL_CORE_FT
PAST MEDICAL HISTORY:  Anemia     H/O bariatric surgery     H/O herpes simplex infection     Miscarriage     Ovarian cyst     Sarcoma      PAST MEDICAL HISTORY:  Anemia     H/O bariatric surgery     H/O cholelithiasis     H/O herpes simplex infection     Miscarriage     Ovarian cyst     Sarcoma

## 2023-12-28 DIAGNOSIS — Z01.818 ENCOUNTER FOR OTHER PREPROCEDURAL EXAMINATION: ICD-10-CM

## 2023-12-28 DIAGNOSIS — K80.20 CALCULUS OF GALLBLADDER WITHOUT CHOLECYSTITIS WITHOUT OBSTRUCTION: ICD-10-CM

## 2024-01-10 ENCOUNTER — TRANSCRIPTION ENCOUNTER (OUTPATIENT)
Age: 39
End: 2024-01-10

## 2024-01-10 ENCOUNTER — APPOINTMENT (OUTPATIENT)
Dept: SURGERY | Facility: HOSPITAL | Age: 39
End: 2024-01-10

## 2024-01-10 ENCOUNTER — RESULT REVIEW (OUTPATIENT)
Age: 39
End: 2024-01-10

## 2024-01-10 ENCOUNTER — OUTPATIENT (OUTPATIENT)
Dept: OUTPATIENT SERVICES | Facility: HOSPITAL | Age: 39
LOS: 1 days | Discharge: HOME | End: 2024-01-10
Payer: MEDICAID

## 2024-01-10 VITALS
DIASTOLIC BLOOD PRESSURE: 82 MMHG | RESPIRATION RATE: 16 BRPM | WEIGHT: 158.73 LBS | HEART RATE: 79 BPM | SYSTOLIC BLOOD PRESSURE: 122 MMHG | TEMPERATURE: 98 F | OXYGEN SATURATION: 99 % | HEIGHT: 62 IN

## 2024-01-10 VITALS
RESPIRATION RATE: 18 BRPM | HEART RATE: 82 BPM | DIASTOLIC BLOOD PRESSURE: 61 MMHG | SYSTOLIC BLOOD PRESSURE: 112 MMHG | OXYGEN SATURATION: 97 %

## 2024-01-10 DIAGNOSIS — K66.0 PERITONEAL ADHESIONS (POSTPROCEDURAL) (POSTINFECTION): ICD-10-CM

## 2024-01-10 DIAGNOSIS — K80.20 CALCULUS OF GALLBLADDER WITHOUT CHOLECYSTITIS WITHOUT OBSTRUCTION: ICD-10-CM

## 2024-01-10 DIAGNOSIS — Z98.84 BARIATRIC SURGERY STATUS: ICD-10-CM

## 2024-01-10 DIAGNOSIS — D64.9 ANEMIA, UNSPECIFIED: ICD-10-CM

## 2024-01-10 DIAGNOSIS — K80.10 CALCULUS OF GALLBLADDER WITH CHRONIC CHOLECYSTITIS WITHOUT OBSTRUCTION: ICD-10-CM

## 2024-01-10 DIAGNOSIS — Z98.890 OTHER SPECIFIED POSTPROCEDURAL STATES: Chronic | ICD-10-CM

## 2024-01-10 DIAGNOSIS — N70.11 CHRONIC SALPINGITIS: ICD-10-CM

## 2024-01-10 DIAGNOSIS — Z85.89 PERSONAL HISTORY OF MALIGNANT NEOPLASM OF OTHER ORGANS AND SYSTEMS: ICD-10-CM

## 2024-01-10 DIAGNOSIS — Z98.84 BARIATRIC SURGERY STATUS: Chronic | ICD-10-CM

## 2024-01-10 DIAGNOSIS — Z98.891 HISTORY OF UTERINE SCAR FROM PREVIOUS SURGERY: Chronic | ICD-10-CM

## 2024-01-10 PROCEDURE — S2900: CPT

## 2024-01-10 PROCEDURE — C9399: CPT

## 2024-01-10 PROCEDURE — 86850 RBC ANTIBODY SCREEN: CPT

## 2024-01-10 PROCEDURE — C1889: CPT

## 2024-01-10 PROCEDURE — 88304 TISSUE EXAM BY PATHOLOGIST: CPT | Mod: 26

## 2024-01-10 PROCEDURE — 86901 BLOOD TYPING SEROLOGIC RH(D): CPT

## 2024-01-10 PROCEDURE — 47562 LAPAROSCOPIC CHOLECYSTECTOMY: CPT

## 2024-01-10 PROCEDURE — 58661 LAPAROSCOPY REMOVE ADNEXA: CPT

## 2024-01-10 PROCEDURE — 86900 BLOOD TYPING SEROLOGIC ABO: CPT

## 2024-01-10 PROCEDURE — 88304 TISSUE EXAM BY PATHOLOGIST: CPT

## 2024-01-10 PROCEDURE — 36415 COLL VENOUS BLD VENIPUNCTURE: CPT

## 2024-01-10 RX ORDER — OXYCODONE HYDROCHLORIDE 5 MG/1
1 TABLET ORAL
Qty: 12 | Refills: 0
Start: 2024-01-10 | End: 2024-01-12

## 2024-01-10 RX ORDER — ONDANSETRON 8 MG/1
4 TABLET, FILM COATED ORAL ONCE
Refills: 0 | Status: COMPLETED | OUTPATIENT
Start: 2024-01-10 | End: 2024-01-10

## 2024-01-10 RX ORDER — ONDANSETRON 4 MG/1
4 TABLET ORAL
Qty: 9 | Refills: 0 | Status: ACTIVE | COMMUNITY
Start: 2024-01-10 | End: 1900-01-01

## 2024-01-10 RX ORDER — METOCLOPRAMIDE HCL 10 MG
10 TABLET ORAL ONCE
Refills: 0 | Status: COMPLETED | OUTPATIENT
Start: 2024-01-10 | End: 2024-01-10

## 2024-01-10 RX ORDER — HYDROMORPHONE HYDROCHLORIDE 2 MG/ML
0.5 INJECTION INTRAMUSCULAR; INTRAVENOUS; SUBCUTANEOUS
Refills: 0 | Status: DISCONTINUED | OUTPATIENT
Start: 2024-01-10 | End: 2024-01-10

## 2024-01-10 RX ORDER — FERROUS SULFATE 325(65) MG
1 TABLET ORAL
Refills: 0 | DISCHARGE

## 2024-01-10 RX ORDER — SODIUM CHLORIDE 9 MG/ML
1000 INJECTION, SOLUTION INTRAVENOUS
Refills: 0 | Status: DISCONTINUED | OUTPATIENT
Start: 2024-01-10 | End: 2024-01-10

## 2024-01-10 RX ORDER — HYDROMORPHONE HYDROCHLORIDE 2 MG/ML
1 INJECTION INTRAMUSCULAR; INTRAVENOUS; SUBCUTANEOUS
Refills: 0 | Status: DISCONTINUED | OUTPATIENT
Start: 2024-01-10 | End: 2024-01-10

## 2024-01-10 RX ORDER — METOCLOPRAMIDE HCL 10 MG
10 TABLET ORAL ONCE
Refills: 0 | Status: DISCONTINUED | OUTPATIENT
Start: 2024-01-10 | End: 2024-01-10

## 2024-01-10 RX ADMIN — ONDANSETRON 4 MILLIGRAM(S): 8 TABLET, FILM COATED ORAL at 10:31

## 2024-01-10 RX ADMIN — SODIUM CHLORIDE 75 MILLILITER(S): 9 INJECTION, SOLUTION INTRAVENOUS at 11:29

## 2024-01-10 RX ADMIN — HYDROMORPHONE HYDROCHLORIDE 0.5 MILLIGRAM(S): 2 INJECTION INTRAMUSCULAR; INTRAVENOUS; SUBCUTANEOUS at 10:31

## 2024-01-10 RX ADMIN — HYDROMORPHONE HYDROCHLORIDE 0.5 MILLIGRAM(S): 2 INJECTION INTRAMUSCULAR; INTRAVENOUS; SUBCUTANEOUS at 11:00

## 2024-01-10 RX ADMIN — Medication 10 MILLIGRAM(S): at 11:28

## 2024-01-10 NOTE — ASU PATIENT PROFILE, ADULT - FALL HARM RISK - UNIVERSAL INTERVENTIONS
Bed in lowest position, wheels locked, appropriate side rails in place/Call bell, personal items and telephone in reach/Instruct patient to call for assistance before getting out of bed or chair/Non-slip footwear when patient is out of bed/[place] to call system/Physically safe environment - no spills, clutter or unnecessary equipment/Purposeful Proactive Rounding/Room/bathroom lighting operational, light cord in reach Bed in lowest position, wheels locked, appropriate side rails in place/Call bell, personal items and telephone in reach/Instruct patient to call for assistance before getting out of bed or chair/Non-slip footwear when patient is out of bed/[place] to call system/Physically safe environment - no spills, clutter or unnecessary equipment/Purposeful Proactive Rounding/Room/bathroom lighting operational, light cord in reach

## 2024-01-10 NOTE — ASU DISCHARGE PLAN (ADULT/PEDIATRIC) - ASU DC SPECIAL INSTRUCTIONSFT
Follow Up with Dr. Tejada as scheduled. Please call office for confirmation of your appointment.    Diet: Lowfat    Pain: You can take over the counter medications such as Tylenol, and Ibuprofen for pain control. Please adhere to the instructions on the back of the bottle. If it was discussed that you would be receiving prescription pain medication upon discharge, this prescription will be sent to your pharmacy.    Antibiotics: None    If you develop fevers, chills, worsening pain, increased drainage from the wound, foul smelling drainage from the wound, nausea that won't subside, vomiting, or any other symptoms of concern, please call MD for further advice, evaluation, and/or treatment.    Activity: Ambulate and get out of bed as tolerated, and with assistance if feeling weak.    You have 4 wounds that are covered in a glue like substance called Dermabond. Underneath the dermabond there are sutures that will dissolve. The Dermabond is waterproof. You can shower normally, but do not soak wounds. You may experience some itching which is normal, but if there is increased pain, redness, bleeding, drainage that won't stop or foul smelling drainage, please call MD.

## 2024-01-10 NOTE — ASU DISCHARGE PLAN (ADULT/PEDIATRIC) - PROVIDER TOKENS
PROVIDER:[TOKEN:[[id number]]] PROVIDER:[TOKEN:[[id number]]] PROVIDER:[TOKEN:[[id number]]]

## 2024-01-10 NOTE — ASU PATIENT PROFILE, ADULT - NSICDXPASTMEDICALHX_GEN_ALL_CORE_FT
PAST MEDICAL HISTORY:  Anemia     H/O bariatric surgery     H/O cholelithiasis     H/O herpes simplex infection     Miscarriage     Ovarian cyst     Sarcoma

## 2024-01-10 NOTE — BRIEF OPERATIVE NOTE - NSICDXBRIEFPROCEDURE_GEN_ALL_CORE_FT
PROCEDURES:  Left salpingectomy 10-Jde-2024 09:51:35  Jalil Pope  Robot-assisted cholecystectomy 10-Jed-2024 09:55:00  Jalil Pope   PROCEDURES:  Left salpingectomy 10-Jed-2024 09:51:35  Jalil Pope  Robot-assisted cholecystectomy 10-Jed-2024 09:55:00  Jalil Pope

## 2024-01-10 NOTE — ASU DISCHARGE PLAN (ADULT/PEDIATRIC) - NS MD DC FALL RISK RISK
For information on Fall & Injury Prevention, visit: [email or web address]-[email or web address]-mobility OR  [email or web address]://www.[email or web address]-prevention-tips-[email or web address]-injury OR  https://www.cdc.gov/steadi/patient.html For information on Fall & Injury Prevention, visit: https://www.[facility]/news/fall-prevention-protects-[email or web address]-maintains-health-and-mobility OR  https://www.[facility]/news/fall-prevention-tips-to-avoid-injury OR  https://www.cdc.gov/steadi/patient.html For information on Fall & Injury Prevention, visit: https://www.[facility]/news/fall-prevention-protects-and-maintains-health-and-mobility OR  https://www.[facility]/news/fall-prevention-tips-to-avoid-injury OR  https://www.cdc.gov/steadi/patient.html

## 2024-01-10 NOTE — BRIEF OPERATIVE NOTE - OPERATION/FINDINGS
Patient taken for robotic cholecystectomy. No complications. Gallbladder minimally inflamed. No peritoneal inflammation. Cystic duct and artery dissected out and critical view achieved. Robotic clips placed on the duct and artery. Patient also with hydrosalpinx so GYN/Onc was consulted intraoperatively and Dr. Jimenez did a left salpingectomy. Ovary spared. Patient taken for robotic cholecystectomy. No complications. Gallbladder minimally inflamed. No peritoneal inflammation. Cystic duct and artery dissected out and critical view achieved. Robotic clips placed on the duct and artery. Prior to surgery patient and her sister were made aware based on her GYN doctor recommendation that we will call intraoperative consult if we found suspicious pelvic tumor entailing her GYN system. patient had left pelvic cystic mass so GYN/Onc was consulted intraoperatively and Dr. Jimenez did a left salpingectomy. Ovary spared.

## 2024-01-10 NOTE — PRE-ANESTHESIA EVALUATION ADULT - NSRADCARDRESULTSFT_GEN_ALL_CORE
Ventricular Rate 84 BPM    Atrial Rate 84 BPM    P-R Interval 108 ms    QRS Duration 94 ms    Q-T Interval 354 ms    QTC Calculation(Bazett) 418 ms    P Axis 12 degrees    R Axis 62 degrees    T Axis 36 degrees    Diagnosis Line Sinus rhythm with sinus arrhythmia with short MI  Otherwise normal ECG Ventricular Rate 84 BPM    Atrial Rate 84 BPM    P-R Interval 108 ms    QRS Duration 94 ms    Q-T Interval 354 ms    QTC Calculation(Bazett) 418 ms    P Axis 12 degrees    R Axis 62 degrees    T Axis 36 degrees    Diagnosis Line Sinus rhythm with sinus arrhythmia with short DC  Otherwise normal ECG

## 2024-01-10 NOTE — ASU PREOP CHECKLIST - STERILIZATION AFFIRMATION
n/a INTERVAL EVENTS:  -- NAEO    SUBJECTIVE:  -- reports feeling well; denies any SOB, fevers, CP and palpitations  -- fell asleep w/her suppository  -- asking if she can come off isolation   -- Review of Systems: 12 point review of systems otherwise negative    MEDICATIONS:  MEDICATIONS  (STANDING):  amitriptyline 25 milliGRAM(s) Oral at bedtime  ascorbic acid 500 milliGRAM(s) Oral daily  baclofen 20 milliGRAM(s) Oral three times a day  bisacodyl Suppository 10 milliGRAM(s) Rectal at bedtime  dronabinol 2.5 milliGRAM(s) Oral every 12 hours  enoxaparin Injectable 40 milliGRAM(s) SubCutaneous at bedtime  gabapentin 300 milliGRAM(s) Oral three times a day  ibuprofen  Tablet. 200 milliGRAM(s) Oral every 12 hours  influenza   Vaccine 0.5 milliLiter(s) IntraMuscular once  multivitamin 1 Tablet(s) Oral daily  zinc sulfate 220 milliGRAM(s) Oral daily    MEDICATIONS  (PRN):    Allergies  No Known Allergies    OBJECTIVE:  Vital Signs Last 24 Hrs  T(C): 36.3 (01 Jan 2022 09:57), Max: 36.7 (31 Dec 2021 20:35)  T(F): 97.4 (01 Jan 2022 09:57), Max: 98 (31 Dec 2021 20:35)  HR: 60 (01 Jan 2022 09:57) (60 - 73)  BP: 100/60 (01 Jan 2022 09:57) (89/63 - 113/77)  BP(mean): --  RR: 18 (01 Jan 2022 09:57) (18 - 18)  SpO2: 95% (01 Jan 2022 09:57) (95% - 100%)  I&O's Summary    31 Dec 2021 07:01  -  01 Jan 2022 07:00  --------------------------------------------------------  IN: 0 mL / OUT: 1995 mL / NET: -1995 mL    PHYSICAL EXAM:  Gen: NAD, sitting upright in bed  HEENT: NCAT, MMM, clear OP  CV: RRR, no m/g/r appreciated  Pulm: CTA B, no w/r/r; no increase in WOB  Abd: normoactive BS, soft, NTND  : +urostomy   Ext: WWP, no c/c/e  Neuro: AOx3, limited extremity exam given pt was solied    LABS:  No new labs.    MICRODATA:  No new microdata.     RADIOLOGY/OTHER STUDIES:  No new imaging.

## 2024-01-10 NOTE — PRE-ANESTHESIA EVALUATION ADULT - NSANTHPMHFT_GEN_ALL_CORE
Patient is a 85 year old female presenting to PAST in preparation for  ROBOTIC POSSIBLE LAPAROSCOPIC POSSIBLE OPEN CHOLECYSTECTOMY on 1/10 under gen anesthesia by Dr. Tejada  Reports h/o cholelithiasis has had abd pain x2 month, was seen by pmd who sent her for sono . The sono revealed gallstones , has been advised to have above

## 2024-01-10 NOTE — ASU DISCHARGE PLAN (ADULT/PEDIATRIC) - CARE PROVIDER_API CALL
Sandy eTjada  Complex General Surgical Oncology  [address], [facility] [address]rd [place] [address]  Phone: [phone]  Fax: [phone]  Follow Up Time:    Sandy Tejada  Complex General Surgical Oncology  [address] [facility] [facility], [facility] [address]rd [place] [address]  Phone: [phone]  Fax: [phone]  Follow Up Time:    Sandy Tejada  Complex General Surgical Oncology  [address] [facility], [facility] [address]rd [place] [address]  Phone: [phone]  Fax: [phone]  Follow Up Time:

## 2024-01-10 NOTE — CHART NOTE - NSCHARTNOTEFT_GEN_A_CORE
PACU ANESTHESIA ADMISSION NOTE      Procedure: Left salpingectomy    Robot-assisted cholecystectomy      Post op diagnosis:  Cholelithiasis    Left Hydrosalpinx        ____  Intubated  TV:______       Rate: ______      FiO2: ______    ____  Patent Airway    ____  Full return of protective reflexes    _x___  Full recovery from anesthesia / back to baseline   Vitals:   T: 98.8          R18                  BP: 122/59                 Sat:100                   P: 80      Mental Status:  _x___ Awake   _____ Alert   _____ Drowsy   _____ Sedated    Nausea/Vomiting:  x____ NO  ______Yes,   See Post - Op Orders          Pain Scale (0-10):  _0____    Treatment: ____ None    ____ See Post - Op/PCA Orders    Post - Operative Fluids:   __x__ Oral   ____ See Post - Op Orders    Plan: Discharge:   __x__Home       _____Floor     _____Critical Care    _____  Other:_________________    Comments:

## 2024-01-10 NOTE — BRIEF OPERATIVE NOTE - NSICDXBRIEFPOSTOP_GEN_ALL_CORE_FT
POST-OP DIAGNOSIS:  Cholelithiasis 10-Jed-2024 09:52:03  Jalil Pope  Hydrosalpinx 10-Jed-2024 09:52:26  Jalil Pope

## 2024-01-10 NOTE — ASU DISCHARGE PLAN (ADULT/PEDIATRIC) - B. DRINK ALCOHOL, BEER, OR WINE
-- Message is from the [facility]--    Reason for Call:   Patient is returning call.    Caller Information       Type Contact Phone    04/09/2019 04:13 PM Phone (Incoming) Karime Gonsalez (Self) [phone] (H)          Alternative phone number: [phone]    Turnaround time given to caller:   \"This message will be sent to [state Provider's name]. The clinical team will fulfill your request as soon as they review your message.\"    
Statement Selected

## 2024-01-11 PROBLEM — Z87.19 PERSONAL HISTORY OF OTHER DISEASES OF THE DIGESTIVE SYSTEM: Chronic | Status: ACTIVE | Noted: 2023-12-27

## 2024-01-11 NOTE — BRIEF OPERATIVE NOTE - OPERATION/FINDINGS
6-7 cm tubular mass arising from the L tube. Adherent to the round ligament and anterior cul de sac.

## 2024-01-19 ENCOUNTER — APPOINTMENT (OUTPATIENT)
Dept: GYNECOLOGIC ONCOLOGY | Facility: CLINIC | Age: 39
End: 2024-01-19
Payer: MEDICAID

## 2024-01-19 PROCEDURE — 99213 OFFICE O/P EST LOW 20 MIN: CPT | Mod: 24

## 2024-01-19 NOTE — REVIEW OF SYSTEMS
[Negative] : Musculoskeletal [Rash] : no rash noted [Ulcer] : no ulcer was seen [Syncope] : no syncope noted [Neuropathy] : no neuropathy [Depression] : no depression [Diabetes] : no diabetes mellitus [Hematuria] : no hematuria [Abn Vag Bleeding] : no abnormal vaginal bleeding [Incontinence] : no incontinence [Normal Sexual Function] : normal sexual function [Fatigue] : no fatigue [Recent Wt Gain ___ Lbs] : no recent weight gain [Recent Wt Loss___ Lbs] : no recent weight loss [Chest Pain] : no chest pain [SOTO] : no dyspnea on exertion [Wheezing] : no wheezing [SOB on Exertion] : no shortness of breath during exertion [Bloody Stools] : no bloody stools [Nausea] : no nausea/vomitting [Muscle Weakness] : no muscle weakness

## 2024-01-19 NOTE — PHYSICAL EXAM
[Absent] : CVAT: absent [Normal] : Mood and affect: Normal [FreeTextEntry1] : NAD [de-identified] : RAFAELA [de-identified] : no edema [de-identified] : soft NT/ND [de-identified] : pelvic deferred [Fully active, able to carry on all pre-disease performance without restriction] : Status 0 - Fully active, able to carry on all pre-disease performance without restriction

## 2024-01-19 NOTE — HISTORY OF PRESENT ILLNESS
[FreeTextEntry1] : 37 y/o P3 seen for intraoperative consultation on 1/10/24  Given history of L sided pelvic pain and outside imaging c/w an ovarian cyst. 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  Intraop there was an abnormal appearing tubal mass - likely a hydrosalpinx which was resected robotically. The aniket was completed without incident per Dr. Tejada.  No complaints.  POBHx:CD x3 No prior STDs Prior Gastric sleeve, abdominoplasty No medical co morbidities

## 2024-01-19 NOTE — DISCUSSION/SUMMARY
[Visit Time ___ Minutes] : [unfilled] minutes [FreeTextEntry1] : Had a discussion with the patient and her partner regarding the events of her surgery as well as management of her mutation. In terns of her surgery, the L fallopian tube was dilated and appeared abnormal. DD includes cancer, abscess but most likely is a hydrosalpinx. No impact on hormonal function or fertility by removing this structuure, would anticipate her pain will improve. 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 Would recommend TLH/BSO at completion of childbearing or age 45. Currently undecided about future fertility. Recommend q 6 month US and ca125.  Patient in agreement with the plan, asked appropriate questions ,  F/u in 6 months

## 2024-01-20 LAB — SURGICAL PATHOLOGY STUDY: SIGNIFICANT CHANGE UP

## 2024-01-23 ENCOUNTER — APPOINTMENT (OUTPATIENT)
Dept: SURGERY | Facility: CLINIC | Age: 39
End: 2024-01-23
Payer: MEDICAID

## 2024-01-23 VITALS
OXYGEN SATURATION: 99 % | BODY MASS INDEX: 28.52 KG/M2 | WEIGHT: 155 LBS | HEART RATE: 89 BPM | SYSTOLIC BLOOD PRESSURE: 116 MMHG | TEMPERATURE: 97 F | DIASTOLIC BLOOD PRESSURE: 70 MMHG | HEIGHT: 62 IN

## 2024-01-23 DIAGNOSIS — K80.20 CALCULUS OF GALLBLADDER W/OUT CHOLECYSTITIS W/OUT OBSTRUCTION: ICD-10-CM

## 2024-01-23 PROCEDURE — 99024 POSTOP FOLLOW-UP VISIT: CPT

## 2024-01-24 PROBLEM — K80.20 SYMPTOMATIC CHOLELITHIASIS: Status: ACTIVE | Noted: 2023-11-28

## 2024-01-24 NOTE — PHYSICAL EXAM
[Normal Breath Sounds] : Normal breath sounds [Normal Rate and Rhythm] : normal rate and rhythm [Abdominal Masses] : No abdominal masses [Abdomen Tenderness] : ~T ~M No abdominal tenderness [Tender] : nontender [Enlarged] : not enlarged [No Rash or Lesion] : No rash or lesion [Purpura] : no purpura  [Petechiae] : no petechiae [Skin Ulcer] : no ulcer [Skin Induration] : no induration [Alert] : alert [Oriented to Person] : oriented to person [Oriented to Place] : oriented to place [Oriented to Time] : oriented to time [de-identified] : well groomed, no distress [de-identified] : soft non tender, non distended. Erythema surrounds port site. Appears allergic

## 2024-01-24 NOTE — REASON FOR VISIT
[Post Op: _________] : a [unfilled] post op visit [FreeTextEntry1] : robotic cholecystectomy and left alpingectomy 1/10/24

## 2024-01-24 NOTE — ASSESSMENT
[FreeTextEntry1] : Sarcoma (171.9) (C49.9) 37F who presented from Dr. 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  8/22/23 Post op visit. Excision of left thigh sarcoma 8/9/23. Incision site clean, dry and intact. No erythema or dehiscence noted. Pathology reviewed with patient.  Final Diagnosis Skin and subcutaneous tissue, left thigh sarcoma, excision: - Dermal scar, surgical site changes and patchy fat necrosis. - No evidence of residual tumor. Comment The prior excisional biopsy specimen ([id number]; [id number]) is reviewed for comparison and . 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 For molecular genetic testing/Next Generation Sequencing, a tissue block has been request for case [id number]). The results will follow as addendum. Copy of report given to patient.    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 Patient seen and examined with Dr. Tejada.  Recovered well. Port sites with erythema. Appears allergic ?dermabond allergy. Path reviewed with patient. Final Diagnosis 1. Gallbladder, cholecystectomy: - Chronic cholecystitis. - Cholelithiasis.   2. Left salpingectomy: - Portion of benign fallopian tube, showing hydrosalpinx with cyst formation, and scattered chronic inflammatory cells seen.  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  Patient was evaluated by both medical and radition oncology. No additional treatment was suggested. Plan for surveillance imaging of chest, abdomen and pelvis.   the above plan of care with discussed in details to the patient and all questions were answered to patient satisfaction. patient instructed to follow up with the referring physician and patient primary care provider   A total of 45 minutes was spent on this visit, obtaining h/p,  reviewing previous notes/imaging, counseling the patient , surgery f/u , ordering tests (below), and documenting the findings in the note.0

## 2024-01-24 NOTE — HISTORY OF PRESENT ILLNESS
[de-identified] : 1/10/24  Robot-assisted cholecystectomy and left salpingectomy (by gyn) [de-identified] : 37F who presented from Dr. Pate's office with pathology results of intermediate grade sarcoma.  She reports a L outer thigh lesion she first noticed in 2019. She thought it was a pimple at first. It has slightly enlarged in size. No associated pain.  She got it resected at her dermatologist's office. Pathology was notable for intermediate grade sarcoma.  No numbness, weakness, tingling with her LLE.  No fever, chills, weight loss.   PATHOLOGY L posterior thigh excision: pleomorphic soft tissue neoplasm Was difficult to classify, so sent to [facility] for 2nd opinion.  Addendum: superficial CD34-positive   PMH: anemia, COVID, ovarian cysts PSH: abdominoplasty 2013, laparoscopic sleeve gastrectomy (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

## 2024-02-02 ENCOUNTER — LABORATORY RESULT (OUTPATIENT)
Age: 39
End: 2024-02-02

## 2024-02-02 ENCOUNTER — APPOINTMENT (OUTPATIENT)
Dept: HEMATOLOGY ONCOLOGY | Facility: CLINIC | Age: 39
End: 2024-02-02
Payer: MEDICAID

## 2024-02-02 ENCOUNTER — OUTPATIENT (OUTPATIENT)
Dept: OUTPATIENT SERVICES | Facility: HOSPITAL | Age: 39
LOS: 1 days | End: 2024-02-02
Payer: MEDICAID

## 2024-02-02 VITALS
HEART RATE: 90 BPM | HEIGHT: 62 IN | DIASTOLIC BLOOD PRESSURE: 77 MMHG | BODY MASS INDEX: 28.52 KG/M2 | TEMPERATURE: 98.2 F | RESPIRATION RATE: 16 BRPM | SYSTOLIC BLOOD PRESSURE: 126 MMHG | WEIGHT: 155 LBS

## 2024-02-02 DIAGNOSIS — C49.9 MALIGNANT NEOPLASM OF CONNECTIVE AND SOFT TISSUE, UNSPECIFIED: ICD-10-CM

## 2024-02-02 DIAGNOSIS — Z98.84 BARIATRIC SURGERY STATUS: Chronic | ICD-10-CM

## 2024-02-02 DIAGNOSIS — Z98.890 OTHER SPECIFIED POSTPROCEDURAL STATES: Chronic | ICD-10-CM

## 2024-02-02 DIAGNOSIS — Z98.891 HISTORY OF UTERINE SCAR FROM PREVIOUS SURGERY: Chronic | ICD-10-CM

## 2024-02-02 LAB
HCT VFR BLD CALC: 36.9 %
HGB BLD-MCNC: 12.2 G/DL
IRON SATN MFR SERPL: 25 %
IRON SERPL-MCNC: 94 UG/DL
MCHC RBC-ENTMCNC: 27.2 PG
MCHC RBC-ENTMCNC: 33.1 G/DL
MCV RBC AUTO: 82.4 FL
PLATELET # BLD AUTO: 249 K/UL
PMV BLD: 10 FL
RBC # BLD: 4.48 M/UL
RBC # FLD: 13.3 %
TIBC SERPL-MCNC: 374 UG/DL
UIBC SERPL-MCNC: 280 UG/DL
WBC # FLD AUTO: 7.28 K/UL

## 2024-02-02 PROCEDURE — 82728 ASSAY OF FERRITIN: CPT

## 2024-02-02 PROCEDURE — 82746 ASSAY OF FOLIC ACID SERUM: CPT

## 2024-02-02 PROCEDURE — 99214 OFFICE O/P EST MOD 30 MIN: CPT

## 2024-02-02 PROCEDURE — 85027 COMPLETE CBC AUTOMATED: CPT

## 2024-02-02 PROCEDURE — 83550 IRON BINDING TEST: CPT

## 2024-02-02 PROCEDURE — 82607 VITAMIN B-12: CPT

## 2024-02-02 PROCEDURE — 83540 ASSAY OF IRON: CPT

## 2024-02-02 NOTE — CONSULT LETTER
[Dear  ___] : Dear  [unfilled], [Consult Letter:] : I had the pleasure of evaluating your patient, [unfilled]. [Please see my note below.] : Please see my note below. [Consult Closing:] : Thank you very much for allowing me to participate in the care of this patient.  If you have any questions, please do not hesitate to contact me. [Sincerely,] : Sincerely, [DrPiotr  ___] : Dr. STANFORD [FreeTextEntry3] : Dr. Umana

## 2024-02-02 NOTE — ASSESSMENT
[FreeTextEntry1] : 38F w/ PMH of iron deficiency anemia and ovarian cyst and PSH of gastric sleeve, abdominoplasty, and  x 3 presented to the oncology clinic after a recent diagnosis of left thigh superficial CD34+ fibroblastic tumor.  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 Occasional mitotic figures are noted. Submitted immunohistochemical slides show that tumor is positive for CD34 (diffuse/weak), Desmin (focal), cytokeratin MCK (focal), and negative for Sox-10, CD30, SMA, CD68, CD15, and [place]-5. -Superficial CD34-positive fibroblastic tumor is a rare soft tissue neoplasm, an indolent tumor that may rarely metastasize to lymph nodes without adverse outcomes. This tumor shares some characteristics with OSKL81-ugzzextfsf soft tissue tumors  -Molecular genetic testing DETECTED GENOMIC ALTERATIONS: No Mutations Identified  IMMUNOTHERAPY BIOMARKERS: TUMOR MUTATION BURDEN: LOW (0 MUTATIONS / MB) MICROSATELLITE INSTABILITY: MSI NEGATIVE (3.23%)  -Pathology of wide local exicision by Dr. Tejada on 23 showed no residual tumor.  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 -Female breast cancer  Increased surveillance: -Annual breast magnetic resonance imaging (MRI) with contrast starting at age 30 years. -Annual mammography with consideration of tomosynthesis starting at age 30 years. -Individualized management after age 75 years. Discussion of risk-reducing bilateral mastectomy, with shared decision-making [7]. There are no data about efficacy of hormonal chemoprevention (tamoxifen or aromatase inhibitor); moreover, studies have found that there is an increased risk of triple-negative breast cancer in PALB2 carriers. Therefore, the benefits of this approach are unknown in PALB2 carriers.    Ovarian cancer  Discussion of risk-reducing bilateral salpingo-oophorectomy (rrBSO) for individuals with a family history of ovarian cancer, particularly in first-degree relatives. Surgery can occur after menopause unless there is a family history of premenopausal ovarian cancer. (See "Overview of hereditary breast and ovarian cancer syndromes", section on 'PALB2'.)   Pancreatic cancer  Discussion of screening for those with a family history of pancreatic cancer in a first- or second-degree relative on the same side of the family as the pathogenic variant. Usually, this is done by contrast-enhanced MRI/magnetic resonance cholangiopancreatography (MRCP) and/or endoscopic ultrasound (EUS).   Plan -2023 BIrads 1 Mammo -She follows with breast surgery and has MR beast pending 2024 and there is a consideration for bilateral mastectomy which -she is considering IMELDA/BSO after 47 follows with Dr. Garcia and she follows with him  every 6 months  -no one in family as had a diagnosis of pancreatic cancer, MR abdomen was denied by insurance when it was ordered by Dr. Teajda we can try again in future   RTC in May 2024 will order MR of thigh than, will call with blood work resutsl, she may need IV iron and or b12 or foalte repalcemtn will see

## 2024-02-02 NOTE — PHYSICAL EXAM
[Fully active, able to carry on all pre-disease performance without restriction] : Status 0 - Fully active, able to carry on all pre-disease performance without restriction [Normal] : affect appropriate [de-identified] : Did not examine thigh today given recent MR and she states it looks the same

## 2024-02-02 NOTE — HISTORY OF PRESENT ILLNESS
[de-identified] : 38F w/ PMH of iron deficiency anemia and ovarian cyst and PSH of gastric sleeve, abdominoplasty, and  x 3 presented to the oncology clinic after a recent diagnosis of left thigh sarcoma.   History goes back to 2019, when she first noticed a bump on her left thigh. She did not seek medical attention until this year when she felt the bump had increased in size. She saw Dr. Pate (dermatologist) and underwent an excisional biopsy. 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 She was referred to Dr. Tejada and underwent wide local excision of the left lateral thigh sarcoma with 2 cm margins. The final pathology of the wide local excision showed no evidence of residual tumor. She was also referred to radiation oncology to assess the role of radiation and was recommended to continue with surveillance.   Today, pt is complaining of left thigh pain at her surgical site. Otherwise, she is in good health and has no other complaints to offer.   PMH: iron deficiency anemia and ovarian cyst PSH: gastric sleeve, abdominoplasty, and  x 3  Social Hx: Denies alcohol or tobacco use Family Hx: Uterine cancer and stomach cancer in Maternal Aunt  [de-identified] : 2/2/2024 She is here for follow up. She had cholecystectomy and also  Left salpingectomy: - Portion of benign fallopian tube, showing hydrosalpinx with cyst formation, and scattered chronic inflammatory cells seen.  She had MR of Left thigh on 11/2023 No evidence for residual/recurrent disease. 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

## 2024-02-03 DIAGNOSIS — C49.9 MALIGNANT NEOPLASM OF CONNECTIVE AND SOFT TISSUE, UNSPECIFIED: ICD-10-CM

## 2024-02-05 LAB
FERRITIN SERPL-MCNC: 14 NG/ML
FOLATE SERPL-MCNC: 18.2 NG/ML
VIT B12 SERPL-MCNC: 504 PG/ML

## 2024-02-13 ENCOUNTER — APPOINTMENT (OUTPATIENT)
Dept: INFUSION THERAPY | Facility: CLINIC | Age: 39
End: 2024-02-13

## 2024-02-21 ENCOUNTER — OUTPATIENT (OUTPATIENT)
Dept: OUTPATIENT SERVICES | Facility: HOSPITAL | Age: 39
LOS: 1 days | End: 2024-02-21
Payer: MEDICAID

## 2024-02-21 ENCOUNTER — APPOINTMENT (OUTPATIENT)
Dept: INFUSION THERAPY | Facility: CLINIC | Age: 39
End: 2024-02-21

## 2024-02-21 DIAGNOSIS — Z98.891 HISTORY OF UTERINE SCAR FROM PREVIOUS SURGERY: Chronic | ICD-10-CM

## 2024-02-21 DIAGNOSIS — Z98.84 BARIATRIC SURGERY STATUS: Chronic | ICD-10-CM

## 2024-02-21 DIAGNOSIS — Z98.890 OTHER SPECIFIED POSTPROCEDURAL STATES: Chronic | ICD-10-CM

## 2024-02-21 DIAGNOSIS — C49.9 MALIGNANT NEOPLASM OF CONNECTIVE AND SOFT TISSUE, UNSPECIFIED: ICD-10-CM

## 2024-02-21 PROCEDURE — 96365 THER/PROPH/DIAG IV INF INIT: CPT

## 2024-02-21 RX ORDER — FERUMOXYTOL 510 MG/17ML
1020 INJECTION INTRAVENOUS ONCE
Refills: 0 | Status: DISCONTINUED | OUTPATIENT
Start: 2024-02-21 | End: 2024-05-23

## 2024-03-21 ENCOUNTER — OUTPATIENT (OUTPATIENT)
Dept: OUTPATIENT SERVICES | Facility: HOSPITAL | Age: 39
LOS: 1 days | End: 2024-03-21
Payer: MEDICAID

## 2024-03-21 ENCOUNTER — APPOINTMENT (OUTPATIENT)
Dept: HEMATOLOGY ONCOLOGY | Facility: CLINIC | Age: 39
End: 2024-03-21
Payer: MEDICAID

## 2024-03-21 VITALS
WEIGHT: 150 LBS | HEIGHT: 62 IN | BODY MASS INDEX: 27.6 KG/M2 | TEMPERATURE: 97.3 F | DIASTOLIC BLOOD PRESSURE: 66 MMHG | HEART RATE: 82 BPM | SYSTOLIC BLOOD PRESSURE: 127 MMHG

## 2024-03-21 DIAGNOSIS — Z98.891 HISTORY OF UTERINE SCAR FROM PREVIOUS SURGERY: Chronic | ICD-10-CM

## 2024-03-21 DIAGNOSIS — Z98.890 OTHER SPECIFIED POSTPROCEDURAL STATES: Chronic | ICD-10-CM

## 2024-03-21 DIAGNOSIS — K91.2 POSTSURGICAL MALABSORPTION, NOT ELSEWHERE CLASSIFIED: ICD-10-CM

## 2024-03-21 DIAGNOSIS — D50.8 OTHER IRON DEFICIENCY ANEMIAS: ICD-10-CM

## 2024-03-21 DIAGNOSIS — Z98.84 BARIATRIC SURGERY STATUS: Chronic | ICD-10-CM

## 2024-03-21 DIAGNOSIS — C49.9 MALIGNANT NEOPLASM OF CONNECTIVE AND SOFT TISSUE, UNSPECIFIED: ICD-10-CM

## 2024-03-21 LAB
BASOPHILS # BLD AUTO: 0.03 K/UL
BASOPHILS NFR BLD AUTO: 0.5 %
EOSINOPHIL # BLD AUTO: 0.08 K/UL
EOSINOPHIL NFR BLD AUTO: 1.3 %
HCT VFR BLD CALC: 38.7 %
HGB BLD-MCNC: 12.9 G/DL
IMM GRANULOCYTES NFR BLD AUTO: 0.5 %
IRON SATN MFR SERPL: 67 %
IRON SERPL-MCNC: 152 UG/DL
LYMPHOCYTES # BLD AUTO: 2.72 K/UL
LYMPHOCYTES NFR BLD AUTO: 43.9 %
MAN DIFF?: NORMAL
MCHC RBC-ENTMCNC: 28.8 PG
MCHC RBC-ENTMCNC: 33.3 G/DL
MCV RBC AUTO: 86.4 FL
MONOCYTES # BLD AUTO: 0.4 K/UL
MONOCYTES NFR BLD AUTO: 6.5 %
NEUTROPHILS # BLD AUTO: 2.94 K/UL
NEUTROPHILS NFR BLD AUTO: 47.3 %
PLATELET # BLD AUTO: 200 K/UL
PMV BLD AUTO: 0 /100 WBCS
RBC # BLD: 4.48 M/UL
RBC # FLD: 15.3 %
TIBC SERPL-MCNC: 226 UG/DL
UIBC SERPL-MCNC: 74 UG/DL
WBC # FLD AUTO: 6.2 K/UL

## 2024-03-21 PROCEDURE — 99214 OFFICE O/P EST MOD 30 MIN: CPT

## 2024-03-21 PROCEDURE — G2211 COMPLEX E/M VISIT ADD ON: CPT | Mod: NC,1L

## 2024-03-21 PROCEDURE — 83550 IRON BINDING TEST: CPT

## 2024-03-21 PROCEDURE — 85027 COMPLETE CBC AUTOMATED: CPT

## 2024-03-21 PROCEDURE — 82728 ASSAY OF FERRITIN: CPT

## 2024-03-21 PROCEDURE — 83540 ASSAY OF IRON: CPT

## 2024-03-22 DIAGNOSIS — C49.9 MALIGNANT NEOPLASM OF CONNECTIVE AND SOFT TISSUE, UNSPECIFIED: ICD-10-CM

## 2024-03-22 LAB — FERRITIN SERPL-MCNC: 282 NG/ML

## 2024-03-26 NOTE — END OF VISIT
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  Will repeat iron studies with CBC as well today given iron deficiency her nutritional deficiencies are from her bariatric surgery leading to malabsorption

## 2024-03-26 NOTE — HISTORY OF PRESENT ILLNESS
[de-identified] : 38F w/ PMH of iron deficiency anemia and ovarian cyst and PSH of gastric sleeve, abdominoplasty, and  x 3 presented to the oncology clinic after a recent diagnosis of left thigh sarcoma.   History goes back to 2019, when she first noticed a bump on her left thigh. She did not seek medical attention until this year when she felt the bump had increased in size. She saw Dr. Pate (dermatologist) and underwent an excisional biopsy. 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 She was referred to Dr. Tejada and underwent wide local excision of the left lateral thigh sarcoma with 2 cm margins. The final pathology of the wide local excision showed no evidence of residual tumor. She was also referred to radiation oncology to assess the role of radiation and was recommended to continue with surveillance.   Today, pt is complaining of left thigh pain at her surgical site. Otherwise, she is in good health and has no other complaints to offer.   PMH: iron deficiency anemia and ovarian cyst PSH: gastric sleeve, abdominoplasty, and  x 3  Social Hx: Denies alcohol or tobacco use Family Hx: Uterine cancer and stomach cancer in Maternal Aunt  [de-identified] : 2/2/2024 She is here for follow up. She had cholecystectomy and also  Left salpingectomy: - Portion of benign fallopian tube, showing hydrosalpinx with cyst formation, and scattered chronic inflammatory cells seen. She had MR of Left thigh on 11/2023 No evidence for residual/recurrent disease. States still h as pain in the thigh and area looks the same. She had period twice this month both heavy. 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  03/21/2021 She is here today for follow-up.  She feels much better today iron infusions.  She is planning to undergo bilateral mastectomy.  But overall feels well.

## 2024-03-26 NOTE — PHYSICAL EXAM
[Fully active, able to carry on all pre-disease performance without restriction] : Status 0 - Fully active, able to carry on all pre-disease performance without restriction [Normal] : affect appropriate [de-identified] : Did not examine thigh today given recent MR and she states it looks the same

## 2024-03-26 NOTE — ASSESSMENT
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 - 06/27/2023 excisional biopsy showed superficial CD34+ fibroblastic tumor - shows a diffuse soft tissue neoplasm comprised of sheets of markedly pleomorphic, epithelioid and spindles cells in background of fibromyxoid stroma and mixed inflammatory infiltrate lymphocytes, plasma cells and eosinophils), arranged in vague fascilces. Occasional mitotic figures are noted. Submitted immunohistochemical slides show that tumor is positive for CD34 (diffuse/weak), Desmin (focal), cytokeratin MCK (focal), and negative for Sox-10, CD30, SMA, CD68, CD15, and [place]-5. - Superficial CD34-positive fibroblastic tumor is a rare soft tissue neoplasm, an indolent tumor that may rarely metastasize to lymph nodes without adverse outcomes. This tumor shares some characteristics with NHIW99-[id number] soft tissue tumors. - Molecular genetic testing DETECTED GENOMIC ALTERATIONS: No Mutations Identified IMMUNOTHERAPY BIOMARKERS: TUMOR MUTATION BURDEN: LOW (0 MUTATIONS / MB) MICROSATELLITE INSTABILITY: MSI NEGATIVE (3.23%) - 08/09/2023 Pathology of wide local exicision by Dr. Tejada showed no residual tumor. - given the rarity of this tumor, there is currently no guideline to direct surveillance. - A case series of 39 patients with CD34+ fibroblast tumor showed that the majority of the patients have ELLIOTT and rare metastatic potential (Int J Clin Exp Pathol 2020;13(1):38-43)  - since there is low metastatic potential, will check MR again in 05/2024  # Hx of iron deficiency anemia likely due to malabsorption from gastric sleeve and possibly menstrual blood loss   - Improved on parenteral iron.  # B12 deficiency anemia likely due to malabsorption from gastric sleeve. - Started on B12 injections.  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 - Female breast cancer == Increased surveillance: -Annual breast magnetic resonance imaging (MRI) with contrast starting at age 30 years. -Annual mammography with consideration of tomosynthesis starting at age 30 years. -Individualized management after age 75 years. Discussion of risk-reducing bilateral mastectomy, with shared decision-making [7]. There are no data about efficacy of hormonal chemoprevention (tamoxifen or aromatase inhibitor); moreover, studies have found that there is an increased risk of triple-negative breast cancer in PALB2 carriers. Therefore, the benefits of this approach are unknown in PALB2 carriers.  == Ovarian cancer Discussion of risk-reducing bilateral salpingo-oophorectomy (rrBSO) for individuals with a family history of ovarian cancer, particularly in first-degree relatives. Surgery can occur after menopause unless there is a family history of premenopausal ovarian cancer. (See "Overview of hereditary breast and ovarian cancer syndromes", section on 'PALB2'.) == Pancreatic cancer:  Discussion of screening for those with a family history of pancreatic cancer in a first- or second-degree relative on the same side of the family as the pathogenic variant. Usually, this is done by contrast-enhanced MRI/magnetic resonance cholangiopancreatography (MRCP) and/or endoscopic ultrasound (EUS).   03/21/2024 Labs reviewed and results discussed with the patient - remains clinically stable to continue current management. All questions were answered to satisfaction.  PLAN: - Continue B12 monthly injections. - She follows with breast surgery and has MR ballesteros pending 2/11/2024 and there is a consideration for bilateral mastectomy - Undergoing presurgical testing. - she is considering IMELDA/BSO after 47 follows with Dr. Garcia and she follows with him every 6 months. - no one in family as had a diagnosis of pancreatic cancer, MR abdomen was denied by insurance when it was ordered by Dr. Tejada we can try again in future - repat MR of thigh - 05/2024.  - Labs today: CBC ferritin B12 and folate  RTC in 05/2024 with CBC ferritin and iron studies.

## 2024-03-27 ENCOUNTER — NON-APPOINTMENT (OUTPATIENT)
Age: 39
End: 2024-03-27

## 2024-03-29 ENCOUNTER — APPOINTMENT (OUTPATIENT)
Age: 39
End: 2024-03-29

## 2024-03-29 ENCOUNTER — OUTPATIENT (OUTPATIENT)
Dept: OUTPATIENT SERVICES | Facility: HOSPITAL | Age: 39
LOS: 1 days | End: 2024-03-29
Payer: MEDICAID

## 2024-03-29 VITALS — TEMPERATURE: 97 F | SYSTOLIC BLOOD PRESSURE: 112 MMHG | HEART RATE: 73 BPM | DIASTOLIC BLOOD PRESSURE: 61 MMHG

## 2024-03-29 DIAGNOSIS — Z98.890 OTHER SPECIFIED POSTPROCEDURAL STATES: Chronic | ICD-10-CM

## 2024-03-29 DIAGNOSIS — Z98.891 HISTORY OF UTERINE SCAR FROM PREVIOUS SURGERY: Chronic | ICD-10-CM

## 2024-03-29 DIAGNOSIS — C49.9 MALIGNANT NEOPLASM OF CONNECTIVE AND SOFT TISSUE, UNSPECIFIED: ICD-10-CM

## 2024-03-29 DIAGNOSIS — Z98.84 BARIATRIC SURGERY STATUS: Chronic | ICD-10-CM

## 2024-03-29 PROCEDURE — 96372 THER/PROPH/DIAG INJ SC/IM: CPT

## 2024-03-29 RX ORDER — PREGABALIN 225 MG/1
1000 CAPSULE ORAL ONCE
Refills: 0 | Status: COMPLETED | OUTPATIENT
Start: 2024-03-29 | End: 2024-03-29

## 2024-03-29 RX ADMIN — PREGABALIN 1000 MICROGRAM(S): 225 CAPSULE ORAL at 10:05

## 2024-03-30 DIAGNOSIS — C49.9 MALIGNANT NEOPLASM OF CONNECTIVE AND SOFT TISSUE, UNSPECIFIED: ICD-10-CM

## 2024-04-05 ENCOUNTER — NON-APPOINTMENT (OUTPATIENT)
Age: 39
End: 2024-04-05

## 2024-04-16 ENCOUNTER — RESULT REVIEW (OUTPATIENT)
Age: 39
End: 2024-04-16

## 2024-04-16 ENCOUNTER — OUTPATIENT (OUTPATIENT)
Dept: OUTPATIENT SERVICES | Facility: HOSPITAL | Age: 39
LOS: 1 days | End: 2024-04-16
Payer: MEDICAID

## 2024-04-16 DIAGNOSIS — Z98.890 OTHER SPECIFIED POSTPROCEDURAL STATES: Chronic | ICD-10-CM

## 2024-04-16 DIAGNOSIS — Z00.8 ENCOUNTER FOR OTHER GENERAL EXAMINATION: ICD-10-CM

## 2024-04-16 DIAGNOSIS — C49.9 MALIGNANT NEOPLASM OF CONNECTIVE AND SOFT TISSUE, UNSPECIFIED: ICD-10-CM

## 2024-04-16 DIAGNOSIS — Z98.84 BARIATRIC SURGERY STATUS: Chronic | ICD-10-CM

## 2024-04-16 DIAGNOSIS — Z98.891 HISTORY OF UTERINE SCAR FROM PREVIOUS SURGERY: Chronic | ICD-10-CM

## 2024-04-16 PROCEDURE — 73720 MRI LWR EXTREMITY W/O&W/DYE: CPT | Mod: LT

## 2024-04-16 PROCEDURE — 73720 MRI LWR EXTREMITY W/O&W/DYE: CPT | Mod: 26,LT

## 2024-04-16 PROCEDURE — A9579: CPT

## 2024-04-17 DIAGNOSIS — C49.9 MALIGNANT NEOPLASM OF CONNECTIVE AND SOFT TISSUE, UNSPECIFIED: ICD-10-CM

## 2024-04-18 ENCOUNTER — APPOINTMENT (OUTPATIENT)
Dept: SURGERY | Facility: CLINIC | Age: 39
End: 2024-04-18
Payer: MEDICAID

## 2024-04-18 DIAGNOSIS — Z15.89 GENETIC SUSCEPTIBILITY TO MALIGNANT NEOPLASM OF BREAST: ICD-10-CM

## 2024-04-18 DIAGNOSIS — Z15.01 GENETIC SUSCEPTIBILITY TO MALIGNANT NEOPLASM OF BREAST: ICD-10-CM

## 2024-04-18 DIAGNOSIS — Z15.09 GENETIC SUSCEPTIBILITY TO MALIGNANT NEOPLASM OF BREAST: ICD-10-CM

## 2024-04-18 PROCEDURE — 99443: CPT | Mod: 93

## 2024-04-26 ENCOUNTER — APPOINTMENT (OUTPATIENT)
Age: 39
End: 2024-04-26

## 2024-04-30 PROBLEM — Z15.01 MONOALLELIC MUTATION OF PALB2 GENE: Status: ACTIVE | Noted: 2023-11-28

## 2024-04-30 NOTE — HISTORY OF PRESENT ILLNESS
[de-identified] : 37F who presented from Dr. Pate's office with pathology results of intermediate grade sarcoma.  She reports a L outer thigh lesion she first noticed in 2019. She thought it was a pimple at first. It has slightly enlarged in size. No associated pain.  She got it resected at her dermatologist's office. Pathology was notable for intermediate grade sarcoma.  No numbness, weakness, tingling with her LLE.  No fever, chills, weight loss.   PATHOLOGY L posterior thigh excision: pleomorphic soft tissue neoplasm Was difficult to classify, so sent to [facility] for 2nd opinion.  Addendum: superficial CD34-positive   PMH: anemia, COVID, ovarian cysts PSH: abdominoplasty 2013, laparoscopic sleeve gastrectomy ([facility] 2012) Meds: iron, MVI Smoking/Alcohol: none 3 children FAMILY HISTORY  Maternal aunt: uterine cancer, stomach cancer  8/22/23 post op visit  11/28/2023: MRI LEFT LE 11/2023 showed left ovarian hgic cyst, no recurrence,  genetic studies showed PLAB2 he had her mom and sister had same gene  [de-identified] : 1/10/24  Robot-assisted cholecystectomy and left salpingectomy (by gyn)

## 2024-04-30 NOTE — PHYSICAL EXAM
[Normal Breath Sounds] : Normal breath sounds [Normal Rate and Rhythm] : normal rate and rhythm [Abdominal Masses] : No abdominal masses [Abdomen Tenderness] : ~T ~M No abdominal tenderness [Tender] : nontender [Enlarged] : not enlarged [No Rash or Lesion] : No rash or lesion [Purpura] : no purpura  [Petechiae] : no petechiae [Skin Ulcer] : no ulcer [Skin Induration] : no induration [Alert] : alert [Oriented to Person] : oriented to person [Oriented to Place] : oriented to place [Oriented to Time] : oriented to time [de-identified] : well groomed, no distress [de-identified] : soft non tender, non distended. Erythema surrounds port site. Appears allergic

## 2024-04-30 NOTE — REASON FOR VISIT
[Follow-Up Visit] : a follow-up visit for [Post Op: _________] : a [unfilled] post op visit [FreeTextEntry1] : robotic cholecystectomy and left alpingectomy 1/10/24

## 2024-04-30 NOTE — ASSESSMENT
[FreeTextEntry1] : Sarcoma (171.9) (C49.9) 37F who presented from Dr. 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  8/22/23 Post op visit. Excision of left thigh sarcoma 8/9/23. Incision site clean, dry and intact. No erythema or dehiscence noted. Pathology reviewed with patient.  Final Diagnosis Skin and subcutaneous tissue, left thigh sarcoma, excision: - Dermal scar, surgical site changes and patchy fat necrosis. - No evidence of residual tumor. Comment The prior excisional biopsy specimen ([id number]; [id number]) is reviewed for comparison and . 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 For molecular genetic testing/Next Generation Sequencing, a tissue block has been request for case [id number]). The results will follow as addendum. Copy of report given to patient.    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 Patient seen and examined with Dr. Tejada.  Recovered well. Port sites with erythema. Appears allergic ?dermabond allergy. Path reviewed with patient. Final Diagnosis 1. Gallbladder, cholecystectomy: - Chronic cholecystitis. - Cholelithiasis.   2. Left salpingectomy: - Portion of benign fallopian tube, showing hydrosalpinx with cyst formation, and scattered chronic inflammatory cells seen.  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  Patient was evaluated by both medical and radition oncology. No additional treatment was suggested. Plan for surveillance imaging of chest, abdomen and pelvis.   4/18/2024: telehealth via phone, no new symptoms, patient is going for bilateral prophylactic mastectomies, will repeat CT scan after mastectomies to rule out lung met or recurrence, she has pain in her left thigh pain neuropathic pain , will send for pain management   the above plan of care with discussed in details to the patient and all questions were answered to patient satisfaction. patient instructed to follow up with the referring physician and patient primary care provider   A total of 25 minutes was spent on this visit, obtaining h/p,  reviewing previous notes/imaging, counseling the patient , surgery f/u , ordering tests (below), and documenting the findings in the note.0

## 2024-05-03 ENCOUNTER — APPOINTMENT (OUTPATIENT)
Age: 39
End: 2024-05-03

## 2024-05-11 ENCOUNTER — RESULT REVIEW (OUTPATIENT)
Age: 39
End: 2024-05-11

## 2024-05-11 ENCOUNTER — OUTPATIENT (OUTPATIENT)
Dept: OUTPATIENT SERVICES | Facility: HOSPITAL | Age: 39
LOS: 1 days | End: 2024-05-11
Payer: MEDICAID

## 2024-05-11 DIAGNOSIS — Z98.891 HISTORY OF UTERINE SCAR FROM PREVIOUS SURGERY: Chronic | ICD-10-CM

## 2024-05-11 DIAGNOSIS — Z98.84 BARIATRIC SURGERY STATUS: Chronic | ICD-10-CM

## 2024-05-11 DIAGNOSIS — Z00.8 ENCOUNTER FOR OTHER GENERAL EXAMINATION: ICD-10-CM

## 2024-05-11 DIAGNOSIS — Z98.890 OTHER SPECIFIED POSTPROCEDURAL STATES: Chronic | ICD-10-CM

## 2024-05-11 PROCEDURE — 74177 CT ABD & PELVIS W/CONTRAST: CPT

## 2024-05-11 PROCEDURE — 71260 CT THORAX DX C+: CPT

## 2024-05-11 PROCEDURE — 71260 CT THORAX DX C+: CPT | Mod: 26

## 2024-05-11 PROCEDURE — 74177 CT ABD & PELVIS W/CONTRAST: CPT | Mod: 26

## 2024-05-12 DIAGNOSIS — Z00.8 ENCOUNTER FOR OTHER GENERAL EXAMINATION: ICD-10-CM

## 2024-05-14 ENCOUNTER — APPOINTMENT (OUTPATIENT)
Dept: SURGERY | Facility: CLINIC | Age: 39
End: 2024-05-14
Payer: MEDICAID

## 2024-05-14 DIAGNOSIS — C49.9 MALIGNANT NEOPLASM OF CONNECTIVE AND SOFT TISSUE, UNSPECIFIED: ICD-10-CM

## 2024-05-14 PROCEDURE — 99443: CPT

## 2024-05-14 NOTE — HISTORY OF PRESENT ILLNESS
[de-identified] : 37F who presented from Dr. Pate's office with pathology results of intermediate grade sarcoma.  She reports a L outer thigh lesion she first noticed in 2019. She thought it was a pimple at first. It has slightly enlarged in size. No associated pain.  She got it resected at her dermatologist's office. Pathology was notable for intermediate grade sarcoma.  No numbness, weakness, tingling with her LLE.  No fever, chills, weight loss.   PATHOLOGY L posterior thigh excision: pleomorphic soft tissue neoplasm Was difficult to classify, so sent to [facility] for 2nd opinion.  Addendum: superficial CD34-positive   PMH: anemia, COVID, ovarian cysts PSH: abdominoplasty 2013, laparoscopic sleeve gastrectomy ([facility] 2012) Meds: iron, MVI Smoking/Alcohol: none 3 children FAMILY HISTORY  Maternal aunt: uterine cancer, stomach cancer  8/22/23 post op visit  11/28/2023: MRI LEFT LE 11/2023 showed left ovarian hgic cyst, no recurrence,  genetic studies showed PLAB2 he had her mom and sister had same gene  [de-identified] : 1/10/24  Robot-assisted cholecystectomy and left salpingectomy (by gyn)

## 2024-05-14 NOTE — PHYSICAL EXAM
[Normal Breath Sounds] : Normal breath sounds [Normal Rate and Rhythm] : normal rate and rhythm [Abdominal Masses] : No abdominal masses [Abdomen Tenderness] : ~T ~M No abdominal tenderness [Tender] : nontender [Enlarged] : not enlarged [No Rash or Lesion] : No rash or lesion [Purpura] : no purpura  [Petechiae] : no petechiae [Skin Ulcer] : no ulcer [Skin Induration] : no induration [Alert] : alert [Oriented to Person] : oriented to person [Oriented to Place] : oriented to place [Oriented to Time] : oriented to time [de-identified] : well groomed, no distress [de-identified] : soft non tender, non distended. Erythema surrounds port site. Appears allergic

## 2024-05-14 NOTE — ASSESSMENT
[FreeTextEntry1] : Sarcoma (171.9) (C49.9) 37F who presented from Dr. 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  8/22/23 Post op visit. Excision of left thigh sarcoma 8/9/23. Incision site clean, dry and intact. No erythema or dehiscence noted. Pathology reviewed with patient.  Final Diagnosis Skin and subcutaneous tissue, left thigh sarcoma, excision: - Dermal scar, surgical site changes and patchy fat necrosis. - No evidence of residual tumor. Comment The prior excisional biopsy specimen ([id number]; [id number]) is reviewed for comparison and . 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 For molecular genetic testing/Next Generation Sequencing, a tissue block has been request for case [id number]). The results will follow as addendum. Copy of report given to patient.    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 Patient seen and examined with Dr. Tejada.  Recovered well. Port sites with erythema. Appears allergic ?dermabond allergy. Path reviewed with patient. Final Diagnosis 1. Gallbladder, cholecystectomy: - Chronic cholecystitis. - Cholelithiasis.   2. Left salpingectomy: - Portion of benign fallopian tube, showing hydrosalpinx with cyst formation, and scattered chronic inflammatory cells seen.  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  Patient was evaluated by both medical and radition oncology. No additional treatment was suggested. Plan for surveillance imaging of chest, abdomen and pelvis.   4/18/2024: telehealth via phone, no new symptoms, patient is going for bilateral prophylactic mastectomies, will repeat CT scan after mastectomies to rule out lung met or recurrence, she has pain in her left thigh pain neuropathic pain , will send for pain management   05/14/2024 : telehealth appt via phone s/p yojana mastectomies with wound complication under hyperbaric oxygen at [facility], reported no other new symptoms, d, will repeat MRI thigh and ct chest october 2024, reviewed today her ct cap  the above plan of care with discussed in details to the patient and all questions were answered to patient satisfaction. patient instructed to follow up with the referring physician and patient primary care provider   A total of 25 minutes was spent on this visit, obtaining h/p,  reviewing previous notes/imaging, counseling the patient , surgery f/u , ordering tests (below), and documenting the findings in the note.0

## 2024-05-22 ENCOUNTER — APPOINTMENT (OUTPATIENT)
Age: 39
End: 2024-05-22

## 2024-05-22 PROBLEM — C49.9 SARCOMA: Status: ACTIVE | Noted: 2023-08-22

## 2024-05-22 NOTE — HISTORY OF PRESENT ILLNESS
[de-identified] : 37F who presented from Dr. Pate's office with pathology results of intermediate grade sarcoma.  She reports a L outer thigh lesion she first noticed in 2019. She thought it was a pimple at first. It has slightly enlarged in size. No associated pain.  She got it resected at her dermatologist's office. Pathology was notable for intermediate grade sarcoma.  No numbness, weakness, tingling with her LLE.  No fever, chills, weight loss.   PATHOLOGY L posterior thigh excision: pleomorphic soft tissue neoplasm Was difficult to classify, so sent to [facility] [place] for 2nd opinion.  Addendum: superficial CD34-positive   PMH: anemia, COVID, ovarian cysts PSH: abdominoplasty 2013, laparoscopic sleeve gastrectomy ([facility] 2012) Meds: iron, MVI Smoking/Alcohol: none 3 children FAMILY HISTORY  Maternal aunt: uterine cancer, stomach cancer  8/22/23 post op visit  11/28/2023: MRI LEFT LE 11/2023 showed left ovarian hgic cyst, no recurrence,  genetic studies showed PLAB2 he had her mom and sister had same gene  [de-identified] : 1/10/24  Robot-assisted cholecystectomy and left salpingectomy (by gyn)

## 2024-05-22 NOTE — PHYSICAL EXAM
[Normal Breath Sounds] : Normal breath sounds [Normal Rate and Rhythm] : normal rate and rhythm [Abdominal Masses] : No abdominal masses [Abdomen Tenderness] : ~T ~M No abdominal tenderness [Tender] : nontender [Enlarged] : not enlarged [No Rash or Lesion] : No rash or lesion [Purpura] : no purpura  [Petechiae] : no petechiae [Skin Ulcer] : no ulcer [Skin Induration] : no induration [Alert] : alert [Oriented to Person] : oriented to person [Oriented to Place] : oriented to place [Oriented to Time] : oriented to time [de-identified] : well groomed, no distress [de-identified] : soft non tender, non distended. Erythema surrounds port site. Appears allergic

## 2024-05-22 NOTE — ASSESSMENT
[FreeTextEntry1] : Sarcoma (171.9) (C49.9) 37F who presented from Dr. 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  8/22/23 Post op visit. Excision of left thigh sarcoma 8/9/23. Incision site clean, dry and intact. No erythema or dehiscence noted. Pathology reviewed with patient.  Final Diagnosis Skin and subcutaneous tissue, left thigh sarcoma, excision: - Dermal scar, surgical site changes and patchy fat necrosis. - No evidence of residual tumor. Comment The prior excisional biopsy specimen ([id number]; [id number]) is reviewed for comparison and . 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 For molecular genetic testing/Next Generation Sequencing, a tissue block has been request for case [id number]). The results will follow as addendum. Copy of report given to patient.    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 Patient seen and examined with Dr. Tejada.  Recovered well. Port sites with erythema. Appears allergic ?dermabond allergy. Path reviewed with patient. Final Diagnosis 1. Gallbladder, cholecystectomy: - Chronic cholecystitis. - Cholelithiasis.   2. Left salpingectomy: - Portion of benign fallopian tube, showing hydrosalpinx with cyst formation, and scattered chronic inflammatory cells seen.  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  Patient was evaluated by both medical and radition oncology. No additional treatment was suggested. Plan for surveillance imaging of chest, abdomen and pelvis.   4/18/2024: telehealth via phone, no new symptoms, patient is going for bilateral prophylactic mastectomies, will repeat CT scan after mastectomies to rule out lung met or recurrence, she has pain in her left thigh pain neuropathic pain , will send for pain management   05/14/2024 : telehealth appt via phone s/p yojana mastectomies with wound complication under hyperbaric oxygen at [facility], reported no other new symptoms, d, will repeat MRI thigh and ct chest october 2024, reviewed today her ct cap  the above plan of care with discussed in details to the patient and all questions were answered to patient satisfaction. patient instructed to follow up with the referring physician and patient primary care provider   A total of 25 minutes was spent on this visit, obtaining h/p,  reviewing previous notes/imaging, counseling the patient , surgery f/u , ordering tests (below), and documenting the findings in the note.0

## 2024-06-11 NOTE — BRIEF OPERATIVE NOTE - NSICDXBRIEFPREOP_GEN_ALL_CORE_FT
Paperwork received and faxed to pre admission testing   PRE-OP DIAGNOSIS:  Cholelithiasis 10-Jed-2024 09:51:49  Jalil Pope  Hydrosalpinx 10-Jed-2024 09:52:16  Jalil Pope

## 2024-06-24 NOTE — ASU PATIENT PROFILE, ADULT - NS TRANSFER PROSTHESIS:
Do you have any concerns after your procedure? No    Are you having any problems since you left the hospital or do you have any questions that I can answer for you? No    Did we meet your expectations in responding to any concerns or complaints?  Yes    Did you experience any delays? No    Any fever, nausea/vomiting, pain, or bleeding?   None    Follow-Up: None Needed   
denies

## 2024-07-19 ENCOUNTER — OUTPATIENT (OUTPATIENT)
Dept: OUTPATIENT SERVICES | Facility: HOSPITAL | Age: 39
LOS: 1 days | End: 2024-07-19
Payer: MEDICAID

## 2024-07-19 ENCOUNTER — APPOINTMENT (OUTPATIENT)
Age: 39
End: 2024-07-19

## 2024-07-19 ENCOUNTER — LABORATORY RESULT (OUTPATIENT)
Age: 39
End: 2024-07-19

## 2024-07-19 DIAGNOSIS — Z98.890 OTHER SPECIFIED POSTPROCEDURAL STATES: Chronic | ICD-10-CM

## 2024-07-19 DIAGNOSIS — Z98.84 BARIATRIC SURGERY STATUS: Chronic | ICD-10-CM

## 2024-07-19 DIAGNOSIS — Z98.891 HISTORY OF UTERINE SCAR FROM PREVIOUS SURGERY: Chronic | ICD-10-CM

## 2024-07-19 DIAGNOSIS — K91.2 POSTSURGICAL MALABSORPTION, NOT ELSEWHERE CLASSIFIED: ICD-10-CM

## 2024-07-19 LAB
HCT VFR BLD CALC: 38.9 %
HGB BLD-MCNC: 13.6 G/DL
MCHC RBC-ENTMCNC: 30.8 PG
MCHC RBC-ENTMCNC: 35 G/DL
MCV RBC AUTO: 88.2 FL
PLATELET # BLD AUTO: 201 K/UL
PMV BLD: 10.2 FL
RBC # BLD: 4.41 M/UL
RBC # FLD: 12 %
WBC # FLD AUTO: 6.68 K/UL

## 2024-07-19 PROCEDURE — 36415 COLL VENOUS BLD VENIPUNCTURE: CPT

## 2024-07-19 PROCEDURE — 83550 IRON BINDING TEST: CPT

## 2024-07-19 PROCEDURE — 83540 ASSAY OF IRON: CPT

## 2024-07-19 PROCEDURE — 82728 ASSAY OF FERRITIN: CPT

## 2024-07-19 PROCEDURE — 85027 COMPLETE CBC AUTOMATED: CPT

## 2024-07-20 DIAGNOSIS — K91.2 POSTSURGICAL MALABSORPTION, NOT ELSEWHERE CLASSIFIED: ICD-10-CM

## 2024-07-22 ENCOUNTER — APPOINTMENT (OUTPATIENT)
Age: 39
End: 2024-07-22
Payer: MEDICAID

## 2024-07-22 ENCOUNTER — OUTPATIENT (OUTPATIENT)
Dept: OUTPATIENT SERVICES | Facility: HOSPITAL | Age: 39
LOS: 1 days | End: 2024-07-22
Payer: MEDICAID

## 2024-07-22 VITALS
BODY MASS INDEX: 28.16 KG/M2 | TEMPERATURE: 98.2 F | RESPIRATION RATE: 16 BRPM | DIASTOLIC BLOOD PRESSURE: 78 MMHG | SYSTOLIC BLOOD PRESSURE: 124 MMHG | WEIGHT: 153 LBS | HEIGHT: 62 IN | HEART RATE: 78 BPM

## 2024-07-22 VITALS — DIASTOLIC BLOOD PRESSURE: 78 MMHG | TEMPERATURE: 98 F | HEART RATE: 78 BPM | SYSTOLIC BLOOD PRESSURE: 124 MMHG

## 2024-07-22 DIAGNOSIS — Z98.890 OTHER SPECIFIED POSTPROCEDURAL STATES: Chronic | ICD-10-CM

## 2024-07-22 DIAGNOSIS — K91.2 POSTSURGICAL MALABSORPTION, NOT ELSEWHERE CLASSIFIED: ICD-10-CM

## 2024-07-22 DIAGNOSIS — D50.8 OTHER IRON DEFICIENCY ANEMIAS: ICD-10-CM

## 2024-07-22 DIAGNOSIS — C49.9 MALIGNANT NEOPLASM OF CONNECTIVE AND SOFT TISSUE, UNSPECIFIED: ICD-10-CM

## 2024-07-22 DIAGNOSIS — Z98.891 HISTORY OF UTERINE SCAR FROM PREVIOUS SURGERY: Chronic | ICD-10-CM

## 2024-07-22 DIAGNOSIS — E53.8 DEFICIENCY OF OTHER SPECIFIED B GROUP VITAMINS: ICD-10-CM

## 2024-07-22 DIAGNOSIS — Z98.84 BARIATRIC SURGERY STATUS: Chronic | ICD-10-CM

## 2024-07-22 LAB
FERRITIN SERPL-MCNC: 34 NG/ML
IRON SATN MFR SERPL: 42 %
IRON SERPL-MCNC: 117 UG/DL
TIBC SERPL-MCNC: 279 UG/DL
UIBC SERPL-MCNC: 162 UG/DL

## 2024-07-22 PROCEDURE — 99214 OFFICE O/P EST MOD 30 MIN: CPT

## 2024-07-22 PROCEDURE — G2211 COMPLEX E/M VISIT ADD ON: CPT | Mod: NC,1L

## 2024-07-22 PROCEDURE — 99214 OFFICE O/P EST MOD 30 MIN: CPT | Mod: 25

## 2024-07-22 PROCEDURE — 96372 THER/PROPH/DIAG INJ SC/IM: CPT

## 2024-07-22 RX ORDER — CYANOCOBALAMIN (VITAMIN B-12) 1000MCG/ML
1000 VIAL (ML) INJECTION ONCE
Refills: 0 | Status: COMPLETED | OUTPATIENT
Start: 2024-07-22 | End: 2024-07-22

## 2024-07-22 RX ADMIN — Medication 1000 MICROGRAM(S): at 15:04

## 2024-07-23 NOTE — ASSESSMENT
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 - 06/27/2023 excisional biopsy showed superficial CD34+ fibroblastic tumor - shows a diffuse soft tissue neoplasm comprised of sheets of markedly pleomorphic, epithelioid and spindles cells in background of fibromyxoid stroma and mixed inflammatory infiltrate lymphocytes, plasma cells and eosinophils), arranged in vague fascilces. Occasional mitotic figures are noted. Submitted immunohistochemical slides show that tumor is positive for CD34 (diffuse/weak), Desmin (focal), cytokeratin MCK (focal), and negative for Sox-10, CD30, SMA, CD68, CD15, and [place]-5. - Superficial CD34-positive fibroblastic tumor is a rare soft tissue neoplasm, an indolent tumor that may rarely metastasize to lymph nodes without adverse outcomes. This tumor shares some characteristics with ZWKA20-jehnzdgcol soft tissue tumors. - Molecular genetic testing DETECTED GENOMIC ALTERATIONS: No Mutations Identified IMMUNOTHERAPY BIOMARKERS: TUMOR MUTATION BURDEN: LOW (0 MUTATIONS / MB) MICROSATELLITE INSTABILITY: MSI NEGATIVE (3.23%) - 08/09/2023 Pathology of wide local exicision by Dr. Tejada showed no residual tumor. - given the rarity of this tumor, there is currently no guideline to direct surveillance. - A case series of 39 patients with CD34+ fibroblast tumor showed that the majority of the patients have ELLIOTT and rare metastatic potential (Int J Clin Exp Pathol 2020;13(1):38-43)  -MR 4.24 No evidence of recurrent disease. - since there is low metastatic potential, will check MR again in 10/2024  # Hx of iron deficiency anemia likely due to malabsorption from gastric sleeve and possibly menstrual blood loss   - Improved on parenteral iron.  # B12 deficiency anemia likely due to malabsorption from gastric sleeve. - Started on B12 injections. -c/w B12 injections.  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 -Increased risk of Female breast cancer --S/P B/L Risk reduction Mastectomy. There are no data about efficacy of hormonal chemo prevention (tamoxifen or aromatase inhibitor); moreover, studies have found that there is an increased risk of triple-negative breast cancer in PALB2 carriers. Therefore, the benefits of this approach are unknown in PALB2 carriers.   == Ovarian cancer Discussion of risk-reducing bilateral salpingo-oophorectomy (rrBSO) for individuals with a family history of ovarian cancer, particularly in first-degree relatives. Surgery can occur after menopause unless there is a family history of premenopausal ovarian cancer. (See "Overview of hereditary breast and ovarian cancer syndromes", section on 'PALB2'.) == Pancreatic cancer:  Discussion of screening for those with a family history of pancreatic cancer in a first- or second-degree relative on the same side of the family as the pathogenic variant. Usually, this is done by contrast-enhanced MRI/magnetic resonance cholangiopancreatography (MRCP) and/or endoscopic ultrasound (EUS).     PLAN: - Continue B12 monthly injections. - she is considering IMELDA/BSO after 45 follows with Dr. Jimenez and she follows with him every 6 months. - no one in family as had a diagnosis of pancreatic cancer, MR abdomen was denied by insurance when it was ordered by Dr. Tejada we can try again in future - repat MR of thigh - 10/2024.  - Labs today: CBC ferritin B12 and folate  RTC in 3 months with CBC ferritin and iron studies and B12.

## 2024-07-23 NOTE — END OF VISIT
[] : Fellow [FreeTextEntry3] : She is here for follow-up today.  Most recent imaging was ELLIOTT.  She underwent bilateral mastectomies she has an issue with left-sided wound healing and is pending to complete reconstructive surgery in the future.  She will remain on B12 injections monthly will monitor her iron studies and administer IV iron as needed.  She will be back in 3 months with blood work we will go from there

## 2024-07-23 NOTE — HISTORY OF PRESENT ILLNESS
[de-identified] : 38F w/ PMH of iron deficiency anemia and ovarian cyst and PSH of gastric sleeve, abdominoplasty, and  x 3 presented to the oncology clinic after a recent diagnosis of left thigh sarcoma.   History goes back to 2019, when she first noticed a bump on her left thigh. She did not seek medical attention until this year when she felt the bump had increased in size. She saw Dr. Pate (dermatologist) and underwent an excisional biopsy. 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 She was referred to Dr. Tejada and underwent wide local excision of the left lateral thigh sarcoma with 2 cm margins. The final pathology of the wide local excision showed no evidence of residual tumor. She was also referred to radiation oncology to assess the role of radiation and was recommended to continue with surveillance.   Today, pt is complaining of left thigh pain at her surgical site. Otherwise, she is in good health and has no other complaints to offer.   PMH: iron deficiency anemia and ovarian cyst PSH: gastric sleeve, abdominoplasty, and  x 3  Social Hx: Denies alcohol or tobacco use Family Hx: Uterine cancer and stomach cancer in Maternal Aunt  [de-identified] : 2/2/2024 She is here for follow up. She had cholecystectomy and also  Left salpingectomy: - Portion of benign fallopian tube, showing hydrosalpinx with cyst formation, and scattered chronic inflammatory cells seen. She had MR of Left thigh on 11/2023 No evidence for residual/recurrent disease. States still h as pain in the thigh and area looks the same. She had period twice this month both heavy. 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  03/21/2021 She is here today for follow-up.  She feels much better today iron infusions.  She is planning to undergo bilateral mastectomy.  But overall feels well.   7/22/24 She is here today for follow-up.  She is s/p bilateral mastectomy. Delayed healing of left mastectomy wound is noted. No signs of infections. No other complaints.

## 2024-07-23 NOTE — ASSESSMENT
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 - 06/27/2023 excisional biopsy showed superficial CD34+ fibroblastic tumor - shows a diffuse soft tissue neoplasm comprised of sheets of markedly pleomorphic, epithelioid and spindles cells in background of fibromyxoid stroma and mixed inflammatory infiltrate lymphocytes, plasma cells and eosinophils), arranged in vague fascilces. Occasional mitotic figures are noted. Submitted immunohistochemical slides show that tumor is positive for CD34 (diffuse/weak), Desmin (focal), cytokeratin MCK (focal), and negative for Sox-10, CD30, SMA, CD68, CD15, and [place]-5. - Superficial CD34-positive fibroblastic tumor is a rare soft tissue neoplasm, an indolent tumor that may rarely metastasize to lymph nodes without adverse outcomes. This tumor shares some characteristics with FAET30-wrptyvqris soft tissue tumors. - Molecular genetic testing DETECTED GENOMIC ALTERATIONS: No Mutations Identified IMMUNOTHERAPY BIOMARKERS: TUMOR MUTATION BURDEN: LOW (0 MUTATIONS / MB) MICROSATELLITE INSTABILITY: MSI NEGATIVE (3.23%) - 08/09/2023 Pathology of wide local exicision by Dr. Tejada showed no residual tumor. - given the rarity of this tumor, there is currently no guideline to direct surveillance. - A case series of 39 patients with CD34+ fibroblast tumor showed that the majority of the patients have ELLIOTT and rare metastatic potential (Int J Clin Exp Pathol 2020;13(1):38-43)  -MR 4.24 No evidence of recurrent disease. - since there is low metastatic potential, will check MR again in 10/2024  # Hx of iron deficiency anemia likely due to malabsorption from gastric sleeve and possibly menstrual blood loss   - Improved on parenteral iron.  # B12 deficiency anemia likely due to malabsorption from gastric sleeve. - Started on B12 injections. -c/w B12 injections.  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 -Increased risk of Female breast cancer --S/P B/L Risk reduction Mastectomy. There are no data about efficacy of hormonal chemo prevention (tamoxifen or aromatase inhibitor); moreover, studies have found that there is an increased risk of triple-negative breast cancer in PALB2 carriers. Therefore, the benefits of this approach are unknown in PALB2 carriers.   == Ovarian cancer Discussion of risk-reducing bilateral salpingo-oophorectomy (rrBSO) for individuals with a family history of ovarian cancer, particularly in first-degree relatives. Surgery can occur after menopause unless there is a family history of premenopausal ovarian cancer. (See "Overview of hereditary breast and ovarian cancer syndromes", section on 'PALB2'.) == Pancreatic cancer:  Discussion of screening for those with a family history of pancreatic cancer in a first- or second-degree relative on the same side of the family as the pathogenic variant. Usually, this is done by contrast-enhanced MRI/magnetic resonance cholangiopancreatography (MRCP) and/or endoscopic ultrasound (EUS).     PLAN: - Continue B12 monthly injections. - she is considering IMELDA/BSO after 45 follows with Dr. Jimenez and she follows with him every 6 months. - no one in family as had a diagnosis of pancreatic cancer, MR abdomen was denied by insurance when it was ordered by Dr. Tejada we can try again in future - repat MR of thigh - 10/2024.  - Labs today: CBC ferritin B12 and folate  RTC in 3 months with CBC ferritin and iron studies and B12.

## 2024-07-23 NOTE — HISTORY OF PRESENT ILLNESS
[de-identified] : 38F w/ PMH of iron deficiency anemia and ovarian cyst and PSH of gastric sleeve, abdominoplasty, and  x 3 presented to the oncology clinic after a recent diagnosis of left thigh sarcoma.   History goes back to 2019, when she first noticed a bump on her left thigh. She did not seek medical attention until this year when she felt the bump had increased in size. She saw Dr. Pate (dermatologist) and underwent an excisional biopsy. 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 She was referred to Dr. Tejada and underwent wide local excision of the left lateral thigh sarcoma with 2 cm margins. The final pathology of the wide local excision showed no evidence of residual tumor. She was also referred to radiation oncology to assess the role of radiation and was recommended to continue with surveillance.   Today, pt is complaining of left thigh pain at her surgical site. Otherwise, she is in good health and has no other complaints to offer.   PMH: iron deficiency anemia and ovarian cyst PSH: gastric sleeve, abdominoplasty, and  x 3  Social Hx: Denies alcohol or tobacco use Family Hx: Uterine cancer and stomach cancer in Maternal Aunt  [de-identified] : 2/2/2024 She is here for follow up. She had cholecystectomy and also  Left salpingectomy: - Portion of benign fallopian tube, showing hydrosalpinx with cyst formation, and scattered chronic inflammatory cells seen. She had MR of Left thigh on 11/2023 No evidence for residual/recurrent disease. States still h as pain in the thigh and area looks the same. She had period twice this month both heavy. 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  03/21/2021 She is here today for follow-up.  She feels much better today iron infusions.  She is planning to undergo bilateral mastectomy.  But overall feels well.   7/22/24 She is here today for follow-up.  She is s/p bilateral mastectomy. Delayed healing of left mastectomy wound is noted. No signs of infections. No other complaints.

## 2024-07-23 NOTE — PHYSICAL EXAM
[Fully active, able to carry on all pre-disease performance without restriction] : Status 0 - Fully active, able to carry on all pre-disease performance without restriction [Normal] : affect appropriate [de-identified] : B/L nipple sparing mastectomy noted. Delayed healing of Left mastectomy wound noted. [de-identified] : Left thigh examined. No palpable lumps noted.

## 2024-07-23 NOTE — PHYSICAL EXAM
[Fully active, able to carry on all pre-disease performance without restriction] : Status 0 - Fully active, able to carry on all pre-disease performance without restriction [Normal] : affect appropriate [de-identified] : B/L nipple sparing mastectomy noted. Delayed healing of Left mastectomy wound noted. [de-identified] : Left thigh examined. No palpable lumps noted.

## 2024-07-26 ENCOUNTER — APPOINTMENT (OUTPATIENT)
Dept: GYNECOLOGIC ONCOLOGY | Facility: CLINIC | Age: 39
End: 2024-07-26

## 2024-07-26 VITALS
BODY MASS INDEX: 27.79 KG/M2 | WEIGHT: 151 LBS | DIASTOLIC BLOOD PRESSURE: 82 MMHG | SYSTOLIC BLOOD PRESSURE: 114 MMHG | HEIGHT: 62 IN

## 2024-07-26 DIAGNOSIS — Z15.01 GENETIC SUSCEPTIBILITY TO MALIGNANT NEOPLASM OF BREAST: ICD-10-CM

## 2024-07-26 DIAGNOSIS — Z15.09 GENETIC SUSCEPTIBILITY TO MALIGNANT NEOPLASM OF BREAST: ICD-10-CM

## 2024-07-26 DIAGNOSIS — Z15.89 GENETIC SUSCEPTIBILITY TO MALIGNANT NEOPLASM OF BREAST: ICD-10-CM

## 2024-07-26 PROCEDURE — 99214 OFFICE O/P EST MOD 30 MIN: CPT

## 2024-07-26 NOTE — PHYSICAL EXAM
[Absent] : CVAT: absent [Normal] : Adnexa(ae): Normal [FreeTextEntry1] : NAD [de-identified] : RAFAELA [de-identified] : no edema [de-identified] : soft NT/ND [de-identified] : + menses [de-identified] : no lesions  [de-identified] : 3x3 [de-identified] : 6 weeks [de-identified] : no abnormal masses, NT [Fully active, able to carry on all pre-disease performance without restriction] : Status 0 - Fully active, able to carry on all pre-disease performance without restriction

## 2024-07-26 NOTE — REVIEW OF SYSTEMS
[Negative] : Musculoskeletal [Rash] : no rash noted [Ulcer] : no ulcer was seen [Syncope] : no syncope noted [Neuropathy] : no neuropathy [Depression] : no depression [Diabetes] : no diabetes mellitus [Hematuria] : no hematuria [Abn Vag Bleeding] : no abnormal vaginal bleeding [Normal Sexual Function] : normal sexual function [Fatigue] : no fatigue [Recent Wt Gain ___ Lbs] : no recent weight gain [Recent Wt Loss___ Lbs] : no recent weight loss [Chest Pain] : no chest pain [SOTO] : no dyspnea on exertion [Wheezing] : no wheezing [SOB on Exertion] : no shortness of breath during exertion [Bloody Stools] : no bloody stools [Nausea] : no nausea/vomitting [Muscle Weakness] : no muscle weakness

## 2024-07-26 NOTE — HISTORY OF PRESENT ILLNESS
[FreeTextEntry1] : 39 y/o P3 seen for intraoperative consultation on 1/10/24  Given history of L sided pelvic pain and outside imaging c/w an ovarian cyst. 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 Intraop there was an abnormal appearing tubal mass - likely a hydrosalpinx which was resected robotically. The aniket was completed without incident per Dr. Tejada.  No complaints. Has h/o low grade sarcoma of the thigh and had prophylactic mastectomies in 5/2024 Norplant was removed, has resumed menses  POBHx:CD x3 No prior STDs Prior Gastric sleeve, abdominoplasty No medical co morbidities

## 2024-07-26 NOTE — DISCUSSION/SUMMARY
[Reviewed Clinical Lab Test(s)] : Results of clinical tests were reviewed. [Reviewed Radiology Report(s)] : Radiology reports were reviewed. [Reviewed Radiology Film/Image(s)] : Images from radiology studies were reviewed and examined. [Visit Time ___ Minutes] : [unfilled] minutes [Face to Face Time___ Minutes] : with [unfilled] minutes in face to face consultation. [FreeTextEntry1] : Reviewed the MRI, no abnormality detected in the pelvic area. General plan is for surveillance now as patient is too young to have oophorectomy at this time.  There is a emerging role for prophylactic salpingectomy, but no consensous recommendation. Will monitor ca125 every 6 months.  Return in 6 months.

## 2024-08-19 ENCOUNTER — OUTPATIENT (OUTPATIENT)
Dept: OUTPATIENT SERVICES | Facility: HOSPITAL | Age: 39
LOS: 1 days | End: 2024-08-19
Payer: MEDICAID

## 2024-08-19 ENCOUNTER — APPOINTMENT (OUTPATIENT)
Age: 39
End: 2024-08-19

## 2024-08-19 ENCOUNTER — LABORATORY RESULT (OUTPATIENT)
Age: 39
End: 2024-08-19

## 2024-08-19 VITALS — DIASTOLIC BLOOD PRESSURE: 77 MMHG | TEMPERATURE: 98 F | HEART RATE: 103 BPM | SYSTOLIC BLOOD PRESSURE: 114 MMHG

## 2024-08-19 DIAGNOSIS — Z98.890 OTHER SPECIFIED POSTPROCEDURAL STATES: Chronic | ICD-10-CM

## 2024-08-19 DIAGNOSIS — Z98.891 HISTORY OF UTERINE SCAR FROM PREVIOUS SURGERY: Chronic | ICD-10-CM

## 2024-08-19 DIAGNOSIS — Z98.84 BARIATRIC SURGERY STATUS: Chronic | ICD-10-CM

## 2024-08-19 DIAGNOSIS — E53.8 DEFICIENCY OF OTHER SPECIFIED B GROUP VITAMINS: ICD-10-CM

## 2024-08-19 LAB
HCT VFR BLD CALC: 38.3 %
HGB BLD-MCNC: 13.5 G/DL
MCHC RBC-ENTMCNC: 29.9 PG
MCHC RBC-ENTMCNC: 35.2 G/DL
MCV RBC AUTO: 84.7 FL
PLATELET # BLD AUTO: 248 K/UL
PMV BLD: 10.1 FL
RBC # BLD: 4.52 M/UL
RBC # FLD: 12.8 %
WBC # FLD AUTO: 7.39 K/UL

## 2024-08-19 PROCEDURE — 85027 COMPLETE CBC AUTOMATED: CPT

## 2024-08-19 PROCEDURE — 96372 THER/PROPH/DIAG INJ SC/IM: CPT

## 2024-08-19 RX ORDER — CYANOCOBALAMIN (VITAMIN B-12) 1000MCG/15
1000 LIQUID (ML) ORAL ONCE
Refills: 0 | Status: COMPLETED | OUTPATIENT
Start: 2024-08-19 | End: 2024-08-19

## 2024-08-19 RX ADMIN — Medication 1000 MICROGRAM(S): at 13:23

## 2024-08-20 DIAGNOSIS — E53.8 DEFICIENCY OF OTHER SPECIFIED B GROUP VITAMINS: ICD-10-CM

## 2024-09-04 ENCOUNTER — APPOINTMENT (OUTPATIENT)
Dept: GYNECOLOGIC ONCOLOGY | Facility: CLINIC | Age: 39
End: 2024-09-04
Payer: MEDICAID

## 2024-09-04 VITALS
DIASTOLIC BLOOD PRESSURE: 88 MMHG | SYSTOLIC BLOOD PRESSURE: 138 MMHG | BODY MASS INDEX: 27.79 KG/M2 | WEIGHT: 151 LBS | HEIGHT: 62 IN

## 2024-09-04 DIAGNOSIS — Z15.09 GENETIC SUSCEPTIBILITY TO MALIGNANT NEOPLASM OF BREAST: ICD-10-CM

## 2024-09-04 DIAGNOSIS — N89.8 OTHER SPECIFIED NONINFLAMMATORY DISORDERS OF VAGINA: ICD-10-CM

## 2024-09-04 DIAGNOSIS — R10.2 PELVIC AND PERINEAL PAIN: ICD-10-CM

## 2024-09-04 DIAGNOSIS — Z15.89 GENETIC SUSCEPTIBILITY TO MALIGNANT NEOPLASM OF BREAST: ICD-10-CM

## 2024-09-04 DIAGNOSIS — Z15.01 GENETIC SUSCEPTIBILITY TO MALIGNANT NEOPLASM OF BREAST: ICD-10-CM

## 2024-09-04 PROCEDURE — 99213 OFFICE O/P EST LOW 20 MIN: CPT

## 2024-09-04 RX ORDER — TERCONAZOLE 80 MG/1
80 SUPPOSITORY VAGINAL
Qty: 3 | Refills: 0 | Status: ACTIVE | COMMUNITY
Start: 2024-09-04 | End: 1900-01-01

## 2024-09-04 NOTE — PHYSICAL EXAM
[Absent] : CVAT: absent [Normal] : Adnexa(ae): Normal [FreeTextEntry1] : NAD [de-identified] : RAFAELA [de-identified] : no edema [de-identified] : soft /ND mild tenderness in the LLQ no rebound or guarding [de-identified] : + menses [de-identified] : no lesions  [de-identified] : 6 weeks [de-identified] : no CMT [de-identified] : tender on the L sidemasses, NT [Fully active, able to carry on all pre-disease performance without restriction] : Status 0 - Fully active, able to carry on all pre-disease performance without restriction

## 2024-09-04 NOTE — REVIEW OF SYSTEMS
[Negative] : Musculoskeletal [Rash] : no rash noted [Ulcer] : no ulcer was seen [Syncope] : no syncope noted [Neuropathy] : no neuropathy [Depression] : no depression [Diabetes] : no diabetes mellitus [Hematuria] : no hematuria [Abn Vag Bleeding] : no abnormal vaginal bleeding [Normal Sexual Function] : normal sexual function [Fatigue] : no fatigue [Recent Wt Gain ___ Lbs] : no recent weight gain [Recent Wt Loss___ Lbs] : no recent weight loss [Chest Pain] : no chest pain [SOTO] : no dyspnea on exertion [Wheezing] : no wheezing [SOB on Exertion] : no shortness of breath during exertion [Bloody Stools] : no bloody stools [Nausea] : no nausea/vomitting [Muscle Weakness] : no muscle weakness [FreeTextEntry4] : irregular menses since the Nexplanon was removed

## 2024-09-04 NOTE — DISCUSSION/SUMMARY
[Reviewed Clinical Lab Test(s)] : Results of clinical tests were reviewed. [Reviewed Radiology Report(s)] : Radiology reports were reviewed. [Visit Time ___ Minutes] : [unfilled] minutes [Face to Face Time___ Minutes] : with [unfilled] minutes in face to face consultation. [FreeTextEntry1] : US of the pelvis ordered, likely ovulatory pain, LMP 8/24/24 Will check results and if no improvement asked her to call back/go to the ED.

## 2024-09-04 NOTE — HISTORY OF PRESENT ILLNESS
[FreeTextEntry1] : 37 y/o P3 seen for intraoperative consultation on 1/10/24  Given history of L sided pelvic pain and outside imaging c/w an ovarian cyst. 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 Intraop there was an abnormal appearing tubal mass - likely a hydrosalpinx which was resected robotically. The aniket was completed without incident per Dr. Tejada.  Has h/o low grade sarcoma of the thigh and had prophylactic mastectomies in 5/2024 Norplant was removed, has resumed menses Was seen in 8/2024 had benign exam, but 5 days ago experienced intermittent LLQ pain which was most severe over the weekend and is now still present but less severe. No N/V or associated Sx

## 2024-09-05 ENCOUNTER — OUTPATIENT (OUTPATIENT)
Dept: OUTPATIENT SERVICES | Facility: HOSPITAL | Age: 39
LOS: 1 days | End: 2024-09-05
Payer: MEDICAID

## 2024-09-05 ENCOUNTER — RESULT REVIEW (OUTPATIENT)
Age: 39
End: 2024-09-05

## 2024-09-05 DIAGNOSIS — Z98.890 OTHER SPECIFIED POSTPROCEDURAL STATES: Chronic | ICD-10-CM

## 2024-09-05 DIAGNOSIS — Z98.84 BARIATRIC SURGERY STATUS: Chronic | ICD-10-CM

## 2024-09-05 DIAGNOSIS — Z98.891 HISTORY OF UTERINE SCAR FROM PREVIOUS SURGERY: Chronic | ICD-10-CM

## 2024-09-05 DIAGNOSIS — R10.2 PELVIC AND PERINEAL PAIN: ICD-10-CM

## 2024-09-05 PROCEDURE — 76856 US EXAM PELVIC COMPLETE: CPT

## 2024-09-05 PROCEDURE — 76856 US EXAM PELVIC COMPLETE: CPT | Mod: 26

## 2024-09-06 DIAGNOSIS — R10.2 PELVIC AND PERINEAL PAIN: ICD-10-CM

## 2024-09-16 ENCOUNTER — APPOINTMENT (OUTPATIENT)
Age: 39
End: 2024-09-16

## 2024-09-16 ENCOUNTER — LABORATORY RESULT (OUTPATIENT)
Age: 39
End: 2024-09-16

## 2024-09-16 ENCOUNTER — OUTPATIENT (OUTPATIENT)
Dept: OUTPATIENT SERVICES | Facility: HOSPITAL | Age: 39
LOS: 1 days | End: 2024-09-16
Payer: MEDICAID

## 2024-09-16 DIAGNOSIS — Z98.891 HISTORY OF UTERINE SCAR FROM PREVIOUS SURGERY: Chronic | ICD-10-CM

## 2024-09-16 DIAGNOSIS — Z98.84 BARIATRIC SURGERY STATUS: Chronic | ICD-10-CM

## 2024-09-16 DIAGNOSIS — Z98.890 OTHER SPECIFIED POSTPROCEDURAL STATES: Chronic | ICD-10-CM

## 2024-09-16 DIAGNOSIS — E53.8 DEFICIENCY OF OTHER SPECIFIED B GROUP VITAMINS: ICD-10-CM

## 2024-09-16 LAB
HCT VFR BLD CALC: 35.8 %
HGB BLD-MCNC: 12.9 G/DL
MCHC RBC-ENTMCNC: 30.7 PG
MCHC RBC-ENTMCNC: 36 G/DL
MCV RBC AUTO: 85.2 FL
PLATELET # BLD AUTO: 218 K/UL
PMV BLD: 11 FL
RBC # BLD: 4.2 M/UL
RBC # FLD: 13.2 %
WBC # FLD AUTO: 8.7 K/UL

## 2024-09-16 PROCEDURE — 85027 COMPLETE CBC AUTOMATED: CPT

## 2024-09-16 PROCEDURE — 96372 THER/PROPH/DIAG INJ SC/IM: CPT

## 2024-09-16 RX ADMIN — Medication 1000 MICROGRAM(S): at 12:52

## 2024-09-17 DIAGNOSIS — E53.8 DEFICIENCY OF OTHER SPECIFIED B GROUP VITAMINS: ICD-10-CM

## 2024-10-15 ENCOUNTER — LABORATORY RESULT (OUTPATIENT)
Age: 39
End: 2024-10-15

## 2024-10-15 ENCOUNTER — APPOINTMENT (OUTPATIENT)
Age: 39
End: 2024-10-15

## 2024-10-15 ENCOUNTER — OUTPATIENT (OUTPATIENT)
Dept: OUTPATIENT SERVICES | Facility: HOSPITAL | Age: 39
LOS: 1 days | End: 2024-10-15
Payer: MEDICAID

## 2024-10-15 VITALS — DIASTOLIC BLOOD PRESSURE: 76 MMHG | TEMPERATURE: 98 F | SYSTOLIC BLOOD PRESSURE: 111 MMHG | HEART RATE: 85 BPM

## 2024-10-15 DIAGNOSIS — Z98.890 OTHER SPECIFIED POSTPROCEDURAL STATES: Chronic | ICD-10-CM

## 2024-10-15 DIAGNOSIS — E53.8 DEFICIENCY OF OTHER SPECIFIED B GROUP VITAMINS: ICD-10-CM

## 2024-10-15 DIAGNOSIS — Z98.891 HISTORY OF UTERINE SCAR FROM PREVIOUS SURGERY: Chronic | ICD-10-CM

## 2024-10-15 DIAGNOSIS — Z98.84 BARIATRIC SURGERY STATUS: Chronic | ICD-10-CM

## 2024-10-15 LAB
HCT VFR BLD CALC: 39.1 %
HGB BLD-MCNC: 13.6 G/DL
MCHC RBC-ENTMCNC: 30.5 PG
MCHC RBC-ENTMCNC: 34.8 G/DL
MCV RBC AUTO: 87.7 FL
PLATELET # BLD AUTO: 192 K/UL
PMV BLD: 10.2 FL
RBC # BLD: 4.46 M/UL
RBC # FLD: 12.7 %
WBC # FLD AUTO: 7.98 K/UL

## 2024-10-15 PROCEDURE — 83540 ASSAY OF IRON: CPT

## 2024-10-15 PROCEDURE — 82728 ASSAY OF FERRITIN: CPT

## 2024-10-15 PROCEDURE — 82746 ASSAY OF FOLIC ACID SERUM: CPT

## 2024-10-15 PROCEDURE — 83550 IRON BINDING TEST: CPT

## 2024-10-15 PROCEDURE — 82607 VITAMIN B-12: CPT

## 2024-10-15 PROCEDURE — 96372 THER/PROPH/DIAG INJ SC/IM: CPT

## 2024-10-15 PROCEDURE — 85027 COMPLETE CBC AUTOMATED: CPT

## 2024-10-15 RX ORDER — CYANOCOBALAMIN 1000 UG/ML
1000 INJECTION, SOLUTION INTRAMUSCULAR; SUBCUTANEOUS ONCE
Refills: 0 | Status: COMPLETED | OUTPATIENT
Start: 2024-10-15 | End: 2024-10-15

## 2024-10-15 RX ADMIN — CYANOCOBALAMIN 1000 MICROGRAM(S): 1000 INJECTION, SOLUTION INTRAMUSCULAR; SUBCUTANEOUS at 12:53

## 2024-10-16 DIAGNOSIS — E53.8 DEFICIENCY OF OTHER SPECIFIED B GROUP VITAMINS: ICD-10-CM

## 2024-10-16 LAB
FERRITIN SERPL-MCNC: 27 NG/ML
FOLATE SERPL-MCNC: 10.5 NG/ML
IRON SATN MFR SERPL: 39 %
IRON SERPL-MCNC: 116 UG/DL
TIBC SERPL-MCNC: 294 UG/DL
UIBC SERPL-MCNC: 178 UG/DL
VIT B12 SERPL-MCNC: 431 PG/ML

## 2024-11-11 ENCOUNTER — LABORATORY RESULT (OUTPATIENT)
Age: 39
End: 2024-11-11

## 2024-11-11 ENCOUNTER — APPOINTMENT (OUTPATIENT)
Age: 39
End: 2024-11-11

## 2024-11-11 ENCOUNTER — OUTPATIENT (OUTPATIENT)
Dept: OUTPATIENT SERVICES | Facility: HOSPITAL | Age: 39
LOS: 1 days | End: 2024-11-11
Payer: MEDICAID

## 2024-11-11 DIAGNOSIS — Z98.891 HISTORY OF UTERINE SCAR FROM PREVIOUS SURGERY: Chronic | ICD-10-CM

## 2024-11-11 DIAGNOSIS — Z98.890 OTHER SPECIFIED POSTPROCEDURAL STATES: Chronic | ICD-10-CM

## 2024-11-11 DIAGNOSIS — Z98.84 BARIATRIC SURGERY STATUS: Chronic | ICD-10-CM

## 2024-11-11 DIAGNOSIS — E53.8 DEFICIENCY OF OTHER SPECIFIED B GROUP VITAMINS: ICD-10-CM

## 2024-11-11 LAB
HCT VFR BLD CALC: 39.8 %
HGB BLD-MCNC: 14 G/DL
MCHC RBC-ENTMCNC: 30.3 PG
MCHC RBC-ENTMCNC: 35.2 G/DL
MCV RBC AUTO: 86.1 FL
PLATELET # BLD AUTO: 230 K/UL
PMV BLD: 10.2 FL
RBC # BLD: 4.62 M/UL
RBC # FLD: 12.5 %
WBC # FLD AUTO: 8.66 K/UL

## 2024-11-11 PROCEDURE — 85027 COMPLETE CBC AUTOMATED: CPT

## 2024-11-11 PROCEDURE — 96372 THER/PROPH/DIAG INJ SC/IM: CPT

## 2024-11-11 RX ORDER — CYANOCOBALAMIN (VITAMIN B-12) 1000MCG/ML
1000 VIAL (ML) INJECTION ONCE
Refills: 0 | Status: COMPLETED | OUTPATIENT
Start: 2024-11-11 | End: 2024-11-11

## 2024-11-11 RX ADMIN — Medication 1000 MICROGRAM(S): at 13:26

## 2024-11-12 ENCOUNTER — NON-APPOINTMENT (OUTPATIENT)
Age: 39
End: 2024-11-12

## 2024-11-12 DIAGNOSIS — E53.8 DEFICIENCY OF OTHER SPECIFIED B GROUP VITAMINS: ICD-10-CM

## 2024-12-09 ENCOUNTER — LABORATORY RESULT (OUTPATIENT)
Age: 39
End: 2024-12-09

## 2024-12-09 ENCOUNTER — OUTPATIENT (OUTPATIENT)
Dept: OUTPATIENT SERVICES | Facility: HOSPITAL | Age: 39
LOS: 1 days | End: 2024-12-09
Payer: MEDICAID

## 2024-12-09 ENCOUNTER — APPOINTMENT (OUTPATIENT)
Age: 39
End: 2024-12-09

## 2024-12-09 DIAGNOSIS — Z98.890 OTHER SPECIFIED POSTPROCEDURAL STATES: Chronic | ICD-10-CM

## 2024-12-09 DIAGNOSIS — E53.8 DEFICIENCY OF OTHER SPECIFIED B GROUP VITAMINS: ICD-10-CM

## 2024-12-09 DIAGNOSIS — Z98.84 BARIATRIC SURGERY STATUS: Chronic | ICD-10-CM

## 2024-12-09 DIAGNOSIS — Z98.891 HISTORY OF UTERINE SCAR FROM PREVIOUS SURGERY: Chronic | ICD-10-CM

## 2024-12-09 LAB
HCT VFR BLD CALC: 39 %
HGB BLD-MCNC: 13.6 G/DL
MCHC RBC-ENTMCNC: 29.9 PG
MCHC RBC-ENTMCNC: 34.9 G/DL
MCV RBC AUTO: 85.7 FL
PLATELET # BLD AUTO: 205 K/UL
PMV BLD: 10.4 FL
RBC # BLD: 4.55 M/UL
RBC # FLD: 12.5 %
WBC # FLD AUTO: 8.49 K/UL

## 2024-12-09 PROCEDURE — 85027 COMPLETE CBC AUTOMATED: CPT

## 2024-12-09 PROCEDURE — 96372 THER/PROPH/DIAG INJ SC/IM: CPT

## 2024-12-09 RX ORDER — CYANOCOBALAMIN 1000 UG/ML
1000 INJECTION INTRAMUSCULAR; SUBCUTANEOUS ONCE
Refills: 0 | Status: COMPLETED | OUTPATIENT
Start: 2024-12-09 | End: 2024-12-09

## 2024-12-09 RX ADMIN — CYANOCOBALAMIN 1000 MICROGRAM(S): 1000 INJECTION INTRAMUSCULAR; SUBCUTANEOUS at 13:28

## 2024-12-10 DIAGNOSIS — E53.8 DEFICIENCY OF OTHER SPECIFIED B GROUP VITAMINS: ICD-10-CM

## 2024-12-31 ENCOUNTER — OUTPATIENT (OUTPATIENT)
Dept: OUTPATIENT SERVICES | Facility: HOSPITAL | Age: 39
LOS: 1 days | End: 2024-12-31
Payer: MEDICAID

## 2024-12-31 ENCOUNTER — RESULT REVIEW (OUTPATIENT)
Age: 39
End: 2024-12-31

## 2024-12-31 DIAGNOSIS — Z98.891 HISTORY OF UTERINE SCAR FROM PREVIOUS SURGERY: Chronic | ICD-10-CM

## 2024-12-31 DIAGNOSIS — Z98.84 BARIATRIC SURGERY STATUS: Chronic | ICD-10-CM

## 2024-12-31 DIAGNOSIS — Z00.8 ENCOUNTER FOR OTHER GENERAL EXAMINATION: ICD-10-CM

## 2024-12-31 DIAGNOSIS — C49.9 MALIGNANT NEOPLASM OF CONNECTIVE AND SOFT TISSUE, UNSPECIFIED: ICD-10-CM

## 2024-12-31 DIAGNOSIS — Z98.890 OTHER SPECIFIED POSTPROCEDURAL STATES: Chronic | ICD-10-CM

## 2024-12-31 PROCEDURE — A9579: CPT

## 2024-12-31 PROCEDURE — 73719 MRI LOWER EXTREMITY W/DYE: CPT | Mod: 26,LT

## 2024-12-31 PROCEDURE — 73719 MRI LOWER EXTREMITY W/DYE: CPT | Mod: LT

## 2025-01-01 DIAGNOSIS — C49.9 MALIGNANT NEOPLASM OF CONNECTIVE AND SOFT TISSUE, UNSPECIFIED: ICD-10-CM

## 2025-01-06 ENCOUNTER — APPOINTMENT (OUTPATIENT)
Age: 40
End: 2025-01-06
Payer: MEDICAID

## 2025-01-06 ENCOUNTER — LABORATORY RESULT (OUTPATIENT)
Age: 40
End: 2025-01-06

## 2025-01-06 ENCOUNTER — OUTPATIENT (OUTPATIENT)
Dept: OUTPATIENT SERVICES | Facility: HOSPITAL | Age: 40
LOS: 1 days | End: 2025-01-06
Payer: MEDICAID

## 2025-01-06 VITALS
OXYGEN SATURATION: 100 % | RESPIRATION RATE: 16 BRPM | DIASTOLIC BLOOD PRESSURE: 88 MMHG | HEIGHT: 62 IN | WEIGHT: 155 LBS | HEART RATE: 89 BPM | SYSTOLIC BLOOD PRESSURE: 127 MMHG | BODY MASS INDEX: 28.52 KG/M2 | TEMPERATURE: 97.1 F

## 2025-01-06 DIAGNOSIS — E53.8 DEFICIENCY OF OTHER SPECIFIED B GROUP VITAMINS: ICD-10-CM

## 2025-01-06 DIAGNOSIS — D50.8 OTHER IRON DEFICIENCY ANEMIAS: ICD-10-CM

## 2025-01-06 DIAGNOSIS — Z98.890 OTHER SPECIFIED POSTPROCEDURAL STATES: Chronic | ICD-10-CM

## 2025-01-06 DIAGNOSIS — Z98.891 HISTORY OF UTERINE SCAR FROM PREVIOUS SURGERY: Chronic | ICD-10-CM

## 2025-01-06 DIAGNOSIS — Z98.84 BARIATRIC SURGERY STATUS: Chronic | ICD-10-CM

## 2025-01-06 PROCEDURE — 83540 ASSAY OF IRON: CPT

## 2025-01-06 PROCEDURE — G2211 COMPLEX E/M VISIT ADD ON: CPT

## 2025-01-06 PROCEDURE — 83550 IRON BINDING TEST: CPT

## 2025-01-06 PROCEDURE — 99214 OFFICE O/P EST MOD 30 MIN: CPT

## 2025-01-06 PROCEDURE — 96372 THER/PROPH/DIAG INJ SC/IM: CPT

## 2025-01-06 PROCEDURE — 99214 OFFICE O/P EST MOD 30 MIN: CPT | Mod: 25

## 2025-01-06 PROCEDURE — 80053 COMPREHEN METABOLIC PANEL: CPT

## 2025-01-06 PROCEDURE — 85027 COMPLETE CBC AUTOMATED: CPT

## 2025-01-06 PROCEDURE — 82728 ASSAY OF FERRITIN: CPT

## 2025-01-06 RX ORDER — CYANOCOBALAMIN 1000 UG/ML
1000 INJECTION INTRAMUSCULAR; SUBCUTANEOUS ONCE
Refills: 0 | Status: COMPLETED | OUTPATIENT
Start: 2025-01-06 | End: 2025-01-06

## 2025-01-06 RX ADMIN — CYANOCOBALAMIN 1000 MICROGRAM(S): 1000 INJECTION INTRAMUSCULAR; SUBCUTANEOUS at 15:55

## 2025-01-07 ENCOUNTER — APPOINTMENT (OUTPATIENT)
Dept: SURGERY | Facility: CLINIC | Age: 40
End: 2025-01-07
Payer: MEDICAID

## 2025-01-07 ENCOUNTER — APPOINTMENT (OUTPATIENT)
Age: 40
End: 2025-01-07

## 2025-01-07 ENCOUNTER — OUTPATIENT (OUTPATIENT)
Dept: OUTPATIENT SERVICES | Facility: HOSPITAL | Age: 40
LOS: 1 days | End: 2025-01-07
Payer: MEDICAID

## 2025-01-07 VITALS
WEIGHT: 155 LBS | SYSTOLIC BLOOD PRESSURE: 118 MMHG | HEIGHT: 62 IN | BODY MASS INDEX: 28.52 KG/M2 | DIASTOLIC BLOOD PRESSURE: 70 MMHG

## 2025-01-07 DIAGNOSIS — Z98.890 OTHER SPECIFIED POSTPROCEDURAL STATES: Chronic | ICD-10-CM

## 2025-01-07 DIAGNOSIS — D50.8 OTHER IRON DEFICIENCY ANEMIAS: ICD-10-CM

## 2025-01-07 DIAGNOSIS — C49.9 MALIGNANT NEOPLASM OF CONNECTIVE AND SOFT TISSUE, UNSPECIFIED: ICD-10-CM

## 2025-01-07 DIAGNOSIS — Z15.09 GENETIC SUSCEPTIBILITY TO MALIGNANT NEOPLASM OF BREAST: ICD-10-CM

## 2025-01-07 DIAGNOSIS — E53.8 DEFICIENCY OF OTHER SPECIFIED B GROUP VITAMINS: ICD-10-CM

## 2025-01-07 DIAGNOSIS — Z15.89 GENETIC SUSCEPTIBILITY TO MALIGNANT NEOPLASM OF BREAST: ICD-10-CM

## 2025-01-07 DIAGNOSIS — Z15.01 GENETIC SUSCEPTIBILITY TO MALIGNANT NEOPLASM OF BREAST: ICD-10-CM

## 2025-01-07 LAB
ALBUMIN SERPL ELPH-MCNC: 4.6 G/DL
ALP BLD-CCNC: 88 U/L
ALT SERPL-CCNC: 7 U/L
ANION GAP SERPL CALC-SCNC: 10 MMOL/L
ANION GAP SERPL CALC-SCNC: 12 MMOL/L
AST SERPL-CCNC: 16 U/L
BILIRUB SERPL-MCNC: 0.4 MG/DL
BUN SERPL-MCNC: 6 MG/DL
BUN SERPL-MCNC: 7 MG/DL
CALCIUM SERPL-MCNC: 8.9 MG/DL
CALCIUM SERPL-MCNC: 9.5 MG/DL
CHLORIDE SERPL-SCNC: 103 MMOL/L
CHLORIDE SERPL-SCNC: 104 MMOL/L
CO2 SERPL-SCNC: 24 MMOL/L
CO2 SERPL-SCNC: 24 MMOL/L
CREAT SERPL-MCNC: 0.7 MG/DL
CREAT SERPL-MCNC: 0.7 MG/DL
EGFR: 114 ML/MIN/1.73M2
EGFR: 114 ML/MIN/1.73M2
FERRITIN SERPL-MCNC: 38 NG/ML
GLUCOSE SERPL-MCNC: 52 MG/DL
GLUCOSE SERPL-MCNC: 72 MG/DL
HCT VFR BLD CALC: 38.9 %
HGB BLD-MCNC: 13.5 G/DL
IRON SATN MFR SERPL: 40 %
IRON SERPL-MCNC: 114 UG/DL
MCHC RBC-ENTMCNC: 29.8 PG
MCHC RBC-ENTMCNC: 34.7 G/DL
MCV RBC AUTO: 85.9 FL
PLATELET # BLD AUTO: 264 K/UL
PMV BLD: 10.2 FL
POTASSIUM SERPL-SCNC: 4 MMOL/L
POTASSIUM SERPL-SCNC: 4.5 MMOL/L
PROT SERPL-MCNC: 7.1 G/DL
RBC # BLD: 4.53 M/UL
RBC # FLD: 12.6 %
SODIUM SERPL-SCNC: 138 MMOL/L
SODIUM SERPL-SCNC: 139 MMOL/L
TIBC SERPL-MCNC: 285 UG/DL
UIBC SERPL-MCNC: 171 UG/DL
WBC # FLD AUTO: 8.83 K/UL

## 2025-01-07 PROCEDURE — 99213 OFFICE O/P EST LOW 20 MIN: CPT

## 2025-01-07 PROCEDURE — 80048 BASIC METABOLIC PNL TOTAL CA: CPT

## 2025-01-07 PROCEDURE — 36415 COLL VENOUS BLD VENIPUNCTURE: CPT

## 2025-01-08 DIAGNOSIS — D50.8 OTHER IRON DEFICIENCY ANEMIAS: ICD-10-CM

## 2025-01-24 ENCOUNTER — APPOINTMENT (OUTPATIENT)
Age: 40
End: 2025-01-24

## 2025-01-24 ENCOUNTER — OUTPATIENT (OUTPATIENT)
Dept: OUTPATIENT SERVICES | Facility: HOSPITAL | Age: 40
LOS: 1 days | End: 2025-01-24
Payer: MEDICAID

## 2025-01-24 DIAGNOSIS — E53.8 DEFICIENCY OF OTHER SPECIFIED B GROUP VITAMINS: ICD-10-CM

## 2025-01-24 DIAGNOSIS — Z98.84 BARIATRIC SURGERY STATUS: Chronic | ICD-10-CM

## 2025-01-24 DIAGNOSIS — Z98.890 OTHER SPECIFIED POSTPROCEDURAL STATES: Chronic | ICD-10-CM

## 2025-01-24 DIAGNOSIS — Z98.891 HISTORY OF UTERINE SCAR FROM PREVIOUS SURGERY: Chronic | ICD-10-CM

## 2025-01-24 PROCEDURE — 96365 THER/PROPH/DIAG IV INF INIT: CPT

## 2025-01-24 RX ORDER — FERUMOXYTOL 510 MG/17ML
510 INJECTION INTRAVENOUS ONCE
Refills: 0 | Status: COMPLETED | OUTPATIENT
Start: 2025-01-24 | End: 2025-01-24

## 2025-01-24 RX ADMIN — FERUMOXYTOL 156 MILLIGRAM(S): 510 INJECTION INTRAVENOUS at 13:15

## 2025-01-24 RX ADMIN — FERUMOXYTOL 510 MILLIGRAM(S): 510 INJECTION INTRAVENOUS at 14:00

## 2025-01-29 ENCOUNTER — APPOINTMENT (OUTPATIENT)
Dept: GYNECOLOGIC ONCOLOGY | Facility: CLINIC | Age: 40
End: 2025-01-29
Payer: MEDICAID

## 2025-01-29 VITALS
DIASTOLIC BLOOD PRESSURE: 57 MMHG | WEIGHT: 155 LBS | BODY MASS INDEX: 28.52 KG/M2 | HEIGHT: 62 IN | HEART RATE: 62 BPM | SYSTOLIC BLOOD PRESSURE: 101 MMHG

## 2025-01-29 DIAGNOSIS — Z15.01 GENETIC SUSCEPTIBILITY TO MALIGNANT NEOPLASM OF BREAST: ICD-10-CM

## 2025-01-29 DIAGNOSIS — R10.2 PELVIC AND PERINEAL PAIN: ICD-10-CM

## 2025-01-29 DIAGNOSIS — Z15.89 GENETIC SUSCEPTIBILITY TO MALIGNANT NEOPLASM OF BREAST: ICD-10-CM

## 2025-01-29 DIAGNOSIS — Z15.09 GENETIC SUSCEPTIBILITY TO MALIGNANT NEOPLASM OF BREAST: ICD-10-CM

## 2025-01-29 PROCEDURE — 99214 OFFICE O/P EST MOD 30 MIN: CPT

## 2025-01-29 RX ORDER — NORETHINDRONE ACETATE AND ETHINYL ESTRADIOL AND FERROUS FUMARATE 1.5-30(21)
1.5-3 KIT ORAL DAILY
Qty: 3 | Refills: 3 | Status: ACTIVE | COMMUNITY
Start: 2025-01-29 | End: 1900-01-01

## 2025-02-24 ENCOUNTER — OUTPATIENT (OUTPATIENT)
Dept: OUTPATIENT SERVICES | Facility: HOSPITAL | Age: 40
LOS: 1 days | End: 2025-02-24
Payer: MEDICAID

## 2025-02-24 ENCOUNTER — APPOINTMENT (OUTPATIENT)
Age: 40
End: 2025-02-24

## 2025-02-24 VITALS
HEART RATE: 79 BPM | TEMPERATURE: 98.2 F | DIASTOLIC BLOOD PRESSURE: 73 MMHG | SYSTOLIC BLOOD PRESSURE: 125 MMHG | OXYGEN SATURATION: 100 % | RESPIRATION RATE: 16 BRPM

## 2025-02-24 VITALS — DIASTOLIC BLOOD PRESSURE: 73 MMHG | HEART RATE: 79 BPM | SYSTOLIC BLOOD PRESSURE: 125 MMHG | TEMPERATURE: 98 F

## 2025-02-24 DIAGNOSIS — E53.8 DEFICIENCY OF OTHER SPECIFIED B GROUP VITAMINS: ICD-10-CM

## 2025-02-24 DIAGNOSIS — Z98.84 BARIATRIC SURGERY STATUS: Chronic | ICD-10-CM

## 2025-02-24 DIAGNOSIS — Z98.890 OTHER SPECIFIED POSTPROCEDURAL STATES: Chronic | ICD-10-CM

## 2025-02-24 DIAGNOSIS — Z98.891 HISTORY OF UTERINE SCAR FROM PREVIOUS SURGERY: Chronic | ICD-10-CM

## 2025-02-24 LAB
AUTO BASOPHILS #: 0.08 K/UL
AUTO BASOPHILS %: 1.1 %
AUTO EOSINOPHILS #: 0.51 K/UL
AUTO EOSINOPHILS %: 6.7 %
AUTO IMMATURE GRANULOCYTES #: 0.01 K/UL
AUTO LYMPHOCYTES #: 2.87 K/UL
AUTO LYMPHOCYTES %: 37.9 %
AUTO MONOCYTES #: 0.53 K/UL
AUTO MONOCYTES %: 7 %
AUTO NEUTROPHILS #: 3.58 K/UL
AUTO NEUTROPHILS %: 47.2 %
AUTO NRBC #: 0 K/UL
HCT VFR BLD CALC: 38.3 %
HGB BLD-MCNC: 13.8 G/DL
IMM GRANULOCYTES NFR BLD AUTO: 0.1 %
MAN DIFF?: NORMAL
MCHC RBC-ENTMCNC: 30.7 PG
MCHC RBC-ENTMCNC: 36 G/DL
MCV RBC AUTO: 85.1 FL
PLATELET # BLD AUTO: 210 K/UL
PMV BLD AUTO: 0 /100 WBCS
PMV BLD: 10 FL
RBC # BLD: 4.5 M/UL
RBC # FLD: 13.4 %
WBC # FLD AUTO: 7.58 K/UL

## 2025-02-24 PROCEDURE — 36415 COLL VENOUS BLD VENIPUNCTURE: CPT

## 2025-02-24 PROCEDURE — 85025 COMPLETE CBC W/AUTO DIFF WBC: CPT

## 2025-02-24 PROCEDURE — 96372 THER/PROPH/DIAG INJ SC/IM: CPT

## 2025-02-24 RX ORDER — CYANOCOBALAMIN 1000 UG/ML
1000 INJECTION INTRAMUSCULAR; SUBCUTANEOUS ONCE
Refills: 0 | Status: COMPLETED | OUTPATIENT
Start: 2025-02-24 | End: 2025-02-24

## 2025-02-24 RX ADMIN — CYANOCOBALAMIN 1000 MICROGRAM(S): 1000 INJECTION INTRAMUSCULAR; SUBCUTANEOUS at 11:23

## 2025-02-25 DIAGNOSIS — E53.8 DEFICIENCY OF OTHER SPECIFIED B GROUP VITAMINS: ICD-10-CM

## 2025-02-26 ENCOUNTER — RESULT REVIEW (OUTPATIENT)
Age: 40
End: 2025-02-26

## 2025-02-26 ENCOUNTER — OUTPATIENT (OUTPATIENT)
Dept: OUTPATIENT SERVICES | Facility: HOSPITAL | Age: 40
LOS: 1 days | End: 2025-02-26
Payer: MEDICAID

## 2025-02-26 DIAGNOSIS — Z98.891 HISTORY OF UTERINE SCAR FROM PREVIOUS SURGERY: Chronic | ICD-10-CM

## 2025-02-26 DIAGNOSIS — Z98.890 OTHER SPECIFIED POSTPROCEDURAL STATES: Chronic | ICD-10-CM

## 2025-02-26 DIAGNOSIS — Z98.84 BARIATRIC SURGERY STATUS: Chronic | ICD-10-CM

## 2025-02-26 DIAGNOSIS — Z00.8 ENCOUNTER FOR OTHER GENERAL EXAMINATION: ICD-10-CM

## 2025-02-26 DIAGNOSIS — C49.9 MALIGNANT NEOPLASM OF CONNECTIVE AND SOFT TISSUE, UNSPECIFIED: ICD-10-CM

## 2025-02-26 PROCEDURE — 73701 CT LOWER EXTREMITY W/DYE: CPT | Mod: RT

## 2025-02-26 PROCEDURE — 73701 CT LOWER EXTREMITY W/DYE: CPT | Mod: 26,RT

## 2025-02-27 DIAGNOSIS — C49.9 MALIGNANT NEOPLASM OF CONNECTIVE AND SOFT TISSUE, UNSPECIFIED: ICD-10-CM

## 2025-03-19 ENCOUNTER — NON-APPOINTMENT (OUTPATIENT)
Age: 40
End: 2025-03-19

## 2025-03-27 ENCOUNTER — APPOINTMENT (OUTPATIENT)
Age: 40
End: 2025-03-27

## 2025-03-27 ENCOUNTER — OUTPATIENT (OUTPATIENT)
Dept: OUTPATIENT SERVICES | Facility: HOSPITAL | Age: 40
LOS: 1 days | End: 2025-03-27
Payer: MEDICAID

## 2025-03-27 VITALS
OXYGEN SATURATION: 99 % | SYSTOLIC BLOOD PRESSURE: 124 MMHG | TEMPERATURE: 98 F | HEART RATE: 74 BPM | DIASTOLIC BLOOD PRESSURE: 76 MMHG

## 2025-03-27 DIAGNOSIS — Z98.890 OTHER SPECIFIED POSTPROCEDURAL STATES: Chronic | ICD-10-CM

## 2025-03-27 DIAGNOSIS — E53.8 DEFICIENCY OF OTHER SPECIFIED B GROUP VITAMINS: ICD-10-CM

## 2025-03-27 DIAGNOSIS — Z98.891 HISTORY OF UTERINE SCAR FROM PREVIOUS SURGERY: Chronic | ICD-10-CM

## 2025-03-27 DIAGNOSIS — Z98.84 BARIATRIC SURGERY STATUS: Chronic | ICD-10-CM

## 2025-03-27 PROCEDURE — 85025 COMPLETE CBC W/AUTO DIFF WBC: CPT

## 2025-03-27 PROCEDURE — 96372 THER/PROPH/DIAG INJ SC/IM: CPT

## 2025-03-27 RX ORDER — CYANOCOBALAMIN 1000 UG/ML
1000 INJECTION INTRAMUSCULAR; SUBCUTANEOUS ONCE
Refills: 0 | Status: COMPLETED | OUTPATIENT
Start: 2025-03-27 | End: 2025-03-27

## 2025-03-27 RX ADMIN — CYANOCOBALAMIN 1000 MICROGRAM(S): 1000 INJECTION INTRAMUSCULAR; SUBCUTANEOUS at 10:35

## 2025-03-28 DIAGNOSIS — E53.8 DEFICIENCY OF OTHER SPECIFIED B GROUP VITAMINS: ICD-10-CM

## 2025-03-31 LAB
AUTO BASOPHILS #: 0.05 K/UL
AUTO BASOPHILS %: 0.7 %
AUTO EOSINOPHILS #: 0.12 K/UL
AUTO EOSINOPHILS %: 1.7 %
AUTO IMMATURE GRANULOCYTES #: 0.02 K/UL
AUTO LYMPHOCYTES #: 3.06 K/UL
AUTO LYMPHOCYTES %: 43.2 %
AUTO MONOCYTES #: 0.45 K/UL
AUTO MONOCYTES %: 6.4 %
AUTO NEUTROPHILS #: 3.38 K/UL
AUTO NEUTROPHILS %: 47.7 %
AUTO NRBC #: 0 K/UL
HCT VFR BLD CALC: 39.5 %
HGB BLD-MCNC: 13.5 G/DL
IMM GRANULOCYTES NFR BLD AUTO: 0.3 %
MAN DIFF?: NORMAL
MCHC RBC-ENTMCNC: 29.7 PG
MCHC RBC-ENTMCNC: 34.2 G/DL
MCV RBC AUTO: 86.8 FL
PLATELET # BLD AUTO: 292 K/UL
PMV BLD AUTO: 0 /100 WBCS
PMV BLD: 9.7 FL
RBC # BLD: 4.55 M/UL
RBC # FLD: 12.9 %
WBC # FLD AUTO: 7.08 K/UL

## 2025-04-21 ENCOUNTER — APPOINTMENT (OUTPATIENT)
Age: 40
End: 2025-04-21

## 2025-05-06 ENCOUNTER — APPOINTMENT (OUTPATIENT)
Age: 40
End: 2025-05-06

## 2025-05-06 ENCOUNTER — NON-APPOINTMENT (OUTPATIENT)
Age: 40
End: 2025-05-06

## 2025-05-06 ENCOUNTER — OUTPATIENT (OUTPATIENT)
Dept: OUTPATIENT SERVICES | Facility: HOSPITAL | Age: 40
LOS: 1 days | End: 2025-05-06
Payer: MEDICAID

## 2025-05-06 DIAGNOSIS — Z98.891 HISTORY OF UTERINE SCAR FROM PREVIOUS SURGERY: Chronic | ICD-10-CM

## 2025-05-06 DIAGNOSIS — E53.8 DEFICIENCY OF OTHER SPECIFIED B GROUP VITAMINS: ICD-10-CM

## 2025-05-06 DIAGNOSIS — Z98.890 OTHER SPECIFIED POSTPROCEDURAL STATES: Chronic | ICD-10-CM

## 2025-05-06 DIAGNOSIS — Z98.84 BARIATRIC SURGERY STATUS: Chronic | ICD-10-CM

## 2025-05-06 LAB
AUTO BASOPHILS #: 0.05 K/UL
AUTO BASOPHILS %: 0.7 %
AUTO EOSINOPHILS #: 0.11 K/UL
AUTO EOSINOPHILS %: 1.5 %
AUTO IMMATURE GRANULOCYTES #: 0.02 K/UL
AUTO LYMPHOCYTES #: 2.89 K/UL
AUTO LYMPHOCYTES %: 40.4 %
AUTO MONOCYTES #: 0.52 K/UL
AUTO MONOCYTES %: 7.3 %
AUTO NEUTROPHILS #: 3.57 K/UL
AUTO NEUTROPHILS %: 49.8 %
AUTO NRBC #: 0 K/UL
HCT VFR BLD CALC: 40.3 %
HGB BLD-MCNC: 13.9 G/DL
IMM GRANULOCYTES NFR BLD AUTO: 0.3 %
MAN DIFF?: NORMAL
MCHC RBC-ENTMCNC: 30.4 PG
MCHC RBC-ENTMCNC: 34.5 G/DL
MCV RBC AUTO: 88.2 FL
PLATELET # BLD AUTO: 219 K/UL
PMV BLD AUTO: 0 /100 WBCS
PMV BLD: 10.2 FL
RBC # BLD: 4.57 M/UL
RBC # FLD: 12.6 %
WBC # FLD AUTO: 7.16 K/UL

## 2025-05-06 PROCEDURE — 85025 COMPLETE CBC W/AUTO DIFF WBC: CPT

## 2025-05-06 PROCEDURE — 96372 THER/PROPH/DIAG INJ SC/IM: CPT

## 2025-05-06 RX ORDER — CYANOCOBALAMIN 1000 UG/ML
1000 INJECTION INTRAMUSCULAR; SUBCUTANEOUS ONCE
Refills: 0 | Status: COMPLETED | OUTPATIENT
Start: 2025-05-06 | End: 2025-05-06

## 2025-05-06 RX ADMIN — CYANOCOBALAMIN 1000 MICROGRAM(S): 1000 INJECTION INTRAMUSCULAR; SUBCUTANEOUS at 10:05

## 2025-05-07 DIAGNOSIS — E53.8 DEFICIENCY OF OTHER SPECIFIED B GROUP VITAMINS: ICD-10-CM

## 2025-05-28 ENCOUNTER — APPOINTMENT (OUTPATIENT)
Dept: GYNECOLOGIC ONCOLOGY | Facility: CLINIC | Age: 40
End: 2025-05-28
Payer: MEDICAID

## 2025-05-28 ENCOUNTER — NON-APPOINTMENT (OUTPATIENT)
Age: 40
End: 2025-05-28

## 2025-05-28 VITALS
DIASTOLIC BLOOD PRESSURE: 96 MMHG | SYSTOLIC BLOOD PRESSURE: 131 MMHG | WEIGHT: 155 LBS | HEART RATE: 97 BPM | HEIGHT: 62 IN | BODY MASS INDEX: 28.52 KG/M2

## 2025-05-28 DIAGNOSIS — R10.2 PELVIC AND PERINEAL PAIN: ICD-10-CM

## 2025-05-28 DIAGNOSIS — Z87.42 PERSONAL HISTORY OF OTHER DISEASES OF THE FEMALE GENITAL TRACT: ICD-10-CM

## 2025-05-28 PROCEDURE — 99214 OFFICE O/P EST MOD 30 MIN: CPT

## 2025-06-02 ENCOUNTER — OUTPATIENT (OUTPATIENT)
Dept: OUTPATIENT SERVICES | Facility: HOSPITAL | Age: 40
LOS: 1 days | End: 2025-06-02
Payer: MEDICAID

## 2025-06-02 ENCOUNTER — APPOINTMENT (OUTPATIENT)
Age: 40
End: 2025-06-02
Payer: MEDICAID

## 2025-06-02 VITALS
DIASTOLIC BLOOD PRESSURE: 69 MMHG | HEART RATE: 72 BPM | SYSTOLIC BLOOD PRESSURE: 111 MMHG | OXYGEN SATURATION: 100 % | RESPIRATION RATE: 18 BRPM | TEMPERATURE: 97 F

## 2025-06-02 DIAGNOSIS — Z15.01 GENETIC SUSCEPTIBILITY TO MALIGNANT NEOPLASM OF BREAST: ICD-10-CM

## 2025-06-02 DIAGNOSIS — E53.8 DEFICIENCY OF OTHER SPECIFIED B GROUP VITAMINS: ICD-10-CM

## 2025-06-02 DIAGNOSIS — Z98.891 HISTORY OF UTERINE SCAR FROM PREVIOUS SURGERY: Chronic | ICD-10-CM

## 2025-06-02 DIAGNOSIS — Z98.890 OTHER SPECIFIED POSTPROCEDURAL STATES: Chronic | ICD-10-CM

## 2025-06-02 DIAGNOSIS — Z15.09 GENETIC SUSCEPTIBILITY TO MALIGNANT NEOPLASM OF BREAST: ICD-10-CM

## 2025-06-02 DIAGNOSIS — C49.9 MALIGNANT NEOPLASM OF CONNECTIVE AND SOFT TISSUE, UNSPECIFIED: ICD-10-CM

## 2025-06-02 DIAGNOSIS — Z15.89 GENETIC SUSCEPTIBILITY TO MALIGNANT NEOPLASM OF BREAST: ICD-10-CM

## 2025-06-02 DIAGNOSIS — Z98.84 BARIATRIC SURGERY STATUS: Chronic | ICD-10-CM

## 2025-06-02 DIAGNOSIS — D50.8 OTHER IRON DEFICIENCY ANEMIAS: ICD-10-CM

## 2025-06-02 LAB
AUTO BASOPHILS #: 0.05 K/UL
AUTO BASOPHILS %: 0.6 %
AUTO EOSINOPHILS #: 0.09 K/UL
AUTO EOSINOPHILS %: 1.1 %
AUTO IMMATURE GRANULOCYTES #: 0.01 K/UL
AUTO LYMPHOCYTES #: 2.94 K/UL
AUTO LYMPHOCYTES %: 36.8 %
AUTO MONOCYTES #: 0.58 K/UL
AUTO MONOCYTES %: 7.3 %
AUTO NEUTROPHILS #: 4.33 K/UL
AUTO NEUTROPHILS %: 54.1 %
AUTO NRBC #: 0 K/UL
HCT VFR BLD CALC: 39.6 %
HGB BLD-MCNC: 14 G/DL
IMM GRANULOCYTES NFR BLD AUTO: 0.1 %
MAN DIFF?: NORMAL
MCHC RBC-ENTMCNC: 31.5 PG
MCHC RBC-ENTMCNC: 35.4 G/DL
MCV RBC AUTO: 89.2 FL
PLATELET # BLD AUTO: 233 K/UL
PMV BLD AUTO: 0 /100 WBCS
PMV BLD: 10.4 FL
RBC # BLD: 4.44 M/UL
RBC # FLD: 12.2 %
WBC # FLD AUTO: 8 K/UL

## 2025-06-02 PROCEDURE — 85025 COMPLETE CBC W/AUTO DIFF WBC: CPT

## 2025-06-02 PROCEDURE — 82728 ASSAY OF FERRITIN: CPT

## 2025-06-02 PROCEDURE — 83540 ASSAY OF IRON: CPT

## 2025-06-02 PROCEDURE — G2211 COMPLEX E/M VISIT ADD ON: CPT | Mod: NC

## 2025-06-02 PROCEDURE — 99214 OFFICE O/P EST MOD 30 MIN: CPT

## 2025-06-02 PROCEDURE — 99214 OFFICE O/P EST MOD 30 MIN: CPT | Mod: 25

## 2025-06-02 PROCEDURE — 83550 IRON BINDING TEST: CPT

## 2025-06-02 PROCEDURE — 96372 THER/PROPH/DIAG INJ SC/IM: CPT

## 2025-06-02 RX ORDER — CYANOCOBALAMIN 1000 UG/ML
1000 INJECTION INTRAMUSCULAR; SUBCUTANEOUS ONCE
Refills: 0 | Status: COMPLETED | OUTPATIENT
Start: 2025-06-02 | End: 2025-06-02

## 2025-06-02 RX ADMIN — CYANOCOBALAMIN 1000 MICROGRAM(S): 1000 INJECTION INTRAMUSCULAR; SUBCUTANEOUS at 10:17

## 2025-06-03 DIAGNOSIS — E53.8 DEFICIENCY OF OTHER SPECIFIED B GROUP VITAMINS: ICD-10-CM

## 2025-06-03 LAB
FERRITIN SERPL-MCNC: 54 NG/ML
IRON SATN MFR SERPL: 42 %
IRON SERPL-MCNC: 119 UG/DL
TIBC SERPL-MCNC: 282 UG/DL
UIBC SERPL-MCNC: 163 UG/DL

## 2025-06-06 ENCOUNTER — OUTPATIENT (OUTPATIENT)
Dept: OUTPATIENT SERVICES | Facility: HOSPITAL | Age: 40
LOS: 1 days | End: 2025-06-06
Payer: MEDICAID

## 2025-06-06 ENCOUNTER — RESULT REVIEW (OUTPATIENT)
Age: 40
End: 2025-06-06

## 2025-06-06 DIAGNOSIS — R10.2 PELVIC AND PERINEAL PAIN: ICD-10-CM

## 2025-06-06 DIAGNOSIS — Z98.890 OTHER SPECIFIED POSTPROCEDURAL STATES: Chronic | ICD-10-CM

## 2025-06-06 DIAGNOSIS — Z98.891 HISTORY OF UTERINE SCAR FROM PREVIOUS SURGERY: Chronic | ICD-10-CM

## 2025-06-06 DIAGNOSIS — Z98.84 BARIATRIC SURGERY STATUS: Chronic | ICD-10-CM

## 2025-06-06 DIAGNOSIS — Z00.8 ENCOUNTER FOR OTHER GENERAL EXAMINATION: ICD-10-CM

## 2025-06-06 PROCEDURE — 76856 US EXAM PELVIC COMPLETE: CPT | Mod: 26

## 2025-06-06 PROCEDURE — 76856 US EXAM PELVIC COMPLETE: CPT

## 2025-06-07 DIAGNOSIS — R10.2 PELVIC AND PERINEAL PAIN: ICD-10-CM

## 2025-06-28 ENCOUNTER — RESULT REVIEW (OUTPATIENT)
Age: 40
End: 2025-06-28

## 2025-06-28 ENCOUNTER — OUTPATIENT (OUTPATIENT)
Dept: OUTPATIENT SERVICES | Facility: HOSPITAL | Age: 40
LOS: 1 days | End: 2025-06-28
Payer: MEDICAID

## 2025-06-28 DIAGNOSIS — Z98.890 OTHER SPECIFIED POSTPROCEDURAL STATES: Chronic | ICD-10-CM

## 2025-06-28 DIAGNOSIS — Z98.84 BARIATRIC SURGERY STATUS: Chronic | ICD-10-CM

## 2025-06-28 DIAGNOSIS — Z15.01 GENETIC SUSCEPTIBILITY TO MALIGNANT NEOPLASM OF BREAST: ICD-10-CM

## 2025-06-28 DIAGNOSIS — Z15.09 GENETIC SUSCEPTIBILITY TO OTHER MALIGNANT NEOPLASM: ICD-10-CM

## 2025-06-28 DIAGNOSIS — Z98.891 HISTORY OF UTERINE SCAR FROM PREVIOUS SURGERY: Chronic | ICD-10-CM

## 2025-06-28 PROCEDURE — 74177 CT ABD & PELVIS W/CONTRAST: CPT | Mod: 26

## 2025-06-28 PROCEDURE — 74177 CT ABD & PELVIS W/CONTRAST: CPT

## 2025-06-29 DIAGNOSIS — Z15.09 GENETIC SUSCEPTIBILITY TO OTHER MALIGNANT NEOPLASM: ICD-10-CM

## 2025-06-29 DIAGNOSIS — Z15.01 GENETIC SUSCEPTIBILITY TO MALIGNANT NEOPLASM OF BREAST: ICD-10-CM

## 2025-06-30 ENCOUNTER — APPOINTMENT (OUTPATIENT)
Age: 40
End: 2025-06-30

## 2025-07-01 ENCOUNTER — APPOINTMENT (OUTPATIENT)
Age: 40
End: 2025-07-01

## 2025-07-31 ENCOUNTER — RESULT REVIEW (OUTPATIENT)
Age: 40
End: 2025-07-31

## 2025-07-31 ENCOUNTER — APPOINTMENT (OUTPATIENT)
Age: 40
End: 2025-07-31

## 2025-07-31 ENCOUNTER — OUTPATIENT (OUTPATIENT)
Dept: OUTPATIENT SERVICES | Facility: HOSPITAL | Age: 40
LOS: 1 days | End: 2025-07-31
Payer: MEDICAID

## 2025-07-31 DIAGNOSIS — Z98.890 OTHER SPECIFIED POSTPROCEDURAL STATES: Chronic | ICD-10-CM

## 2025-07-31 DIAGNOSIS — Z98.84 BARIATRIC SURGERY STATUS: Chronic | ICD-10-CM

## 2025-07-31 DIAGNOSIS — Z00.8 ENCOUNTER FOR OTHER GENERAL EXAMINATION: ICD-10-CM

## 2025-07-31 DIAGNOSIS — C49.9 MALIGNANT NEOPLASM OF CONNECTIVE AND SOFT TISSUE, UNSPECIFIED: ICD-10-CM

## 2025-07-31 DIAGNOSIS — Z98.891 HISTORY OF UTERINE SCAR FROM PREVIOUS SURGERY: Chronic | ICD-10-CM

## 2025-07-31 PROCEDURE — 73720 MRI LWR EXTREMITY W/O&W/DYE: CPT | Mod: LT

## 2025-07-31 PROCEDURE — A9579: CPT

## 2025-07-31 PROCEDURE — 73720 MRI LWR EXTREMITY W/O&W/DYE: CPT | Mod: 26,LT

## 2025-08-01 DIAGNOSIS — C49.9 MALIGNANT NEOPLASM OF CONNECTIVE AND SOFT TISSUE, UNSPECIFIED: ICD-10-CM

## 2025-08-05 ENCOUNTER — APPOINTMENT (OUTPATIENT)
Dept: SURGERY | Facility: CLINIC | Age: 40
End: 2025-08-05

## 2025-08-05 VITALS
HEIGHT: 62 IN | SYSTOLIC BLOOD PRESSURE: 126 MMHG | HEART RATE: 97 BPM | BODY MASS INDEX: 28.52 KG/M2 | TEMPERATURE: 97 F | WEIGHT: 155 LBS | DIASTOLIC BLOOD PRESSURE: 84 MMHG | OXYGEN SATURATION: 98 %

## 2025-08-05 DIAGNOSIS — Z15.09 GENETIC SUSCEPTIBILITY TO MALIGNANT NEOPLASM OF BREAST: ICD-10-CM

## 2025-08-05 DIAGNOSIS — Z15.89 GENETIC SUSCEPTIBILITY TO MALIGNANT NEOPLASM OF BREAST: ICD-10-CM

## 2025-08-05 DIAGNOSIS — Z15.01 GENETIC SUSCEPTIBILITY TO MALIGNANT NEOPLASM OF BREAST: ICD-10-CM

## 2025-08-05 DIAGNOSIS — Z15.89 GENETIC SUSCEPTIBILITY TO OTHER DISEASE: ICD-10-CM

## 2025-08-05 DIAGNOSIS — C49.9 MALIGNANT NEOPLASM OF CONNECTIVE AND SOFT TISSUE, UNSPECIFIED: ICD-10-CM

## 2025-08-05 PROCEDURE — 99214 OFFICE O/P EST MOD 30 MIN: CPT

## 2025-08-11 ENCOUNTER — NON-APPOINTMENT (OUTPATIENT)
Age: 40
End: 2025-08-11

## 2025-08-13 ENCOUNTER — APPOINTMENT (OUTPATIENT)
Dept: GYNECOLOGIC ONCOLOGY | Facility: CLINIC | Age: 40
End: 2025-08-13

## 2025-08-19 RX ORDER — SODIUM SULFATE, POTASSIUM SULFATE AND MAGNESIUM SULFATE 1.6; 3.13; 17.5 G/177ML; G/177ML; G/177ML
17.5-3.13-1.6 SOLUTION ORAL
Qty: 1 | Refills: 0 | Status: ACTIVE | COMMUNITY
Start: 2025-08-19 | End: 1900-01-01

## 2025-08-26 ENCOUNTER — APPOINTMENT (OUTPATIENT)
Age: 40
End: 2025-08-26

## 2025-09-13 ENCOUNTER — RESULT REVIEW (OUTPATIENT)
Age: 40
End: 2025-09-13

## 2025-09-18 ENCOUNTER — APPOINTMENT (OUTPATIENT)
Dept: SURGERY | Facility: CLINIC | Age: 40
End: 2025-09-18
Payer: MEDICAID

## 2025-09-18 DIAGNOSIS — C49.9 MALIGNANT NEOPLASM OF CONNECTIVE AND SOFT TISSUE, UNSPECIFIED: ICD-10-CM

## 2025-09-18 DIAGNOSIS — Z15.89 GENETIC SUSCEPTIBILITY TO OTHER DISEASE: ICD-10-CM

## 2025-09-18 PROCEDURE — 99213 OFFICE O/P EST LOW 20 MIN: CPT | Mod: 93
